# Patient Record
Sex: FEMALE | Race: WHITE | NOT HISPANIC OR LATINO | Employment: FULL TIME | URBAN - NONMETROPOLITAN AREA
[De-identification: names, ages, dates, MRNs, and addresses within clinical notes are randomized per-mention and may not be internally consistent; named-entity substitution may affect disease eponyms.]

---

## 2020-03-20 ENCOUNTER — OFFICE VISIT (OUTPATIENT)
Dept: PHARMACY | Facility: HOSPITAL | Age: 25
End: 2020-03-20

## 2020-03-20 VITALS
SYSTOLIC BLOOD PRESSURE: 125 MMHG | HEIGHT: 69 IN | WEIGHT: 155 LBS | BODY MASS INDEX: 22.96 KG/M2 | HEART RATE: 86 BPM | DIASTOLIC BLOOD PRESSURE: 82 MMHG | OXYGEN SATURATION: 95 %

## 2020-03-20 DIAGNOSIS — B18.2 CHRONIC HEPATITIS C WITHOUT HEPATIC COMA (HCC): Primary | ICD-10-CM

## 2020-03-20 LAB
ALBUMIN SERPL-MCNC: 4.6 G/DL (ref 3.5–5.2)
ALBUMIN/GLOB SERPL: 2.2 G/DL
ALP SERPL-CCNC: 88 U/L (ref 39–117)
ALPHA-FETOPROTEIN: 2.15 NG/ML (ref 0–8.3)
ALT SERPL W P-5'-P-CCNC: 55 U/L (ref 1–33)
ANION GAP SERPL CALCULATED.3IONS-SCNC: 12.2 MMOL/L (ref 5–15)
AST SERPL-CCNC: 39 U/L (ref 1–32)
BILIRUB SERPL-MCNC: 0.9 MG/DL (ref 0.2–1.2)
BUN BLD-MCNC: 8 MG/DL (ref 6–20)
BUN/CREAT SERPL: 15.4 (ref 7–25)
CALCIUM SPEC-SCNC: 9.2 MG/DL (ref 8.6–10.5)
CHLORIDE SERPL-SCNC: 102 MMOL/L (ref 98–107)
CO2 SERPL-SCNC: 25.8 MMOL/L (ref 22–29)
CREAT BLD-MCNC: 0.52 MG/DL (ref 0.57–1)
DEPRECATED RDW RBC AUTO: 38.7 FL (ref 37–54)
ERYTHROCYTE [DISTWIDTH] IN BLOOD BY AUTOMATED COUNT: 12.5 % (ref 12.3–15.4)
GFR SERPL CREATININE-BSD FRML MDRD: 146 ML/MIN/1.73
GLOBULIN UR ELPH-MCNC: 2.1 GM/DL
GLUCOSE BLD-MCNC: 91 MG/DL (ref 65–99)
HBV SURFACE AB SER RIA-ACNC: NORMAL
HCG SERPL QL: NEGATIVE
HCT VFR BLD AUTO: 37.5 % (ref 34–46.6)
HGB BLD-MCNC: 12.9 G/DL (ref 12–15.9)
HIV1+2 AB SER QL: NORMAL
INR PPP: 0.91 (ref 0.9–1.1)
MCH RBC QN AUTO: 29.3 PG (ref 26.6–33)
MCHC RBC AUTO-ENTMCNC: 34.4 G/DL (ref 31.5–35.7)
MCV RBC AUTO: 85 FL (ref 79–97)
PLATELET # BLD AUTO: 286 10*3/MM3 (ref 140–450)
PMV BLD AUTO: 11.4 FL (ref 6–12)
POTASSIUM BLD-SCNC: 3.7 MMOL/L (ref 3.5–5.2)
PROT SERPL-MCNC: 6.7 G/DL (ref 6–8.5)
PROTHROMBIN TIME: 12.7 SECONDS (ref 11–15.4)
RBC # BLD AUTO: 4.41 10*6/MM3 (ref 3.77–5.28)
SODIUM BLD-SCNC: 140 MMOL/L (ref 136–145)
TSH SERPL DL<=0.05 MIU/L-ACNC: 0.99 UIU/ML (ref 0.27–4.2)
WBC NRBC COR # BLD: 7.85 10*3/MM3 (ref 3.4–10.8)

## 2020-03-20 PROCEDURE — 86708 HEPATITIS A ANTIBODY: CPT

## 2020-03-20 PROCEDURE — 84443 ASSAY THYROID STIM HORMONE: CPT

## 2020-03-20 PROCEDURE — 80307 DRUG TEST PRSMV CHEM ANLYZR: CPT

## 2020-03-20 PROCEDURE — 36415 COLL VENOUS BLD VENIPUNCTURE: CPT

## 2020-03-20 PROCEDURE — 80053 COMPREHEN METABOLIC PANEL: CPT

## 2020-03-20 PROCEDURE — 86706 HEP B SURFACE ANTIBODY: CPT

## 2020-03-20 PROCEDURE — 86704 HEP B CORE ANTIBODY TOTAL: CPT

## 2020-03-20 PROCEDURE — G0483 DRUG TEST DEF 22+ CLASSES: HCPCS

## 2020-03-20 PROCEDURE — 87522 HEPATITIS C REVRS TRNSCRPJ: CPT

## 2020-03-20 PROCEDURE — G0432 EIA HIV-1/HIV-2 SCREEN: HCPCS

## 2020-03-20 PROCEDURE — 87902 NFCT AGT GNTYP ALYS HEP C: CPT

## 2020-03-20 PROCEDURE — 81596 NFCT DS CHRNC HCV 6 ASSAYS: CPT

## 2020-03-20 PROCEDURE — 82105 ALPHA-FETOPROTEIN SERUM: CPT

## 2020-03-20 PROCEDURE — 85610 PROTHROMBIN TIME: CPT

## 2020-03-20 PROCEDURE — 99243 OFF/OP CNSLTJ NEW/EST LOW 30: CPT | Performed by: PHYSICIAN ASSISTANT

## 2020-03-20 PROCEDURE — 84703 CHORIONIC GONADOTROPIN ASSAY: CPT

## 2020-03-20 PROCEDURE — 85027 COMPLETE CBC AUTOMATED: CPT

## 2020-03-20 RX ORDER — BUSPIRONE HYDROCHLORIDE 5 MG/1
5 TABLET ORAL 2 TIMES DAILY
COMMUNITY
End: 2020-10-15 | Stop reason: SDUPTHER

## 2020-03-20 NOTE — PROGRESS NOTES
: 1995    Chief Complaint   Patient presents with   • Hepatitis C       Yoli Quijano is a 24 y.o. female who presents to the office today as a consultation from AVERY Wall for evaluation of Hepatitis C.    History of Present Illness:  She has known about having Hepatitis C since 2019. She found out while she was pregnant. She has been clean now from illicit drug use for the past 4 years. Does admit past history of IVDU. Has only professional tattoos. No personal history of other liver disease. No known family history of liver disease. No personal history of alcoholism and no current alcohol use. No recent liver imaging. She would like to be considered for Hepatitis C therapy. 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     Review of Systems   Constitutional: Negative for chills, fatigue and fever.   HENT: Negative for trouble swallowing.    Eyes: Negative.    Respiratory: Negative for cough, choking, chest tightness and stridor.    Cardiovascular: Negative for chest pain.   Gastrointestinal: Negative for abdominal distention, abdominal pain, anal bleeding, blood in stool, constipation, diarrhea, nausea and vomiting.   Endocrine: Negative.    Genitourinary: Negative for difficulty urinating.   Musculoskeletal: Negative.    Skin: Negative for color change, pallor, rash and wound.   Allergic/Immunologic: Negative for environmental allergies and food allergies.   Neurological: Negative for dizziness, light-headedness and headaches.   Hematological: Does not bruise/bleed easily.   Psychiatric/Behavioral: Negative.      I have reviewed and confirmed the accuracy of the ROS as documented by the MA/LPN/RN Katie Heaton PA-C    Past Medical History:   Diagnosis Date   • Infectious viral hepatitis        Past Surgical History:   Procedure Laterality Date   • BREAST BIOPSY     • DILATATION AND CURETTAGE         Family  "History   Problem Relation Age of Onset   • No Known Problems Mother    • No Known Problems Father        Social History     Socioeconomic History   • Marital status: Unknown     Spouse name: Not on file   • Number of children: Not on file   • Years of education: Not on file   • Highest education level: Not on file   Tobacco Use   • Smoking status: Never Smoker   • Smokeless tobacco: Never Used   Substance and Sexual Activity   • Alcohol use: Not Currently   • Drug use: Not Currently   • Sexual activity: Defer       Current Outpatient Medications:   •  busPIRone (BUSPAR) 7.5 MG tablet, Take 7.5 mg by mouth 2 (Two) Times a Day., Disp: , Rfl:     Allergies:   Patient has no known allergies.    Vitals:  /82 (BP Location: Right arm, Patient Position: Sitting, Cuff Size: Adult)   Pulse 86   Ht 175.3 cm (69\")   Wt 70.3 kg (155 lb)   SpO2 95%   BMI 22.89 kg/m²     Physical Exam   Constitutional: She is oriented to person, place, and time. She appears well-developed and well-nourished. No distress.   HENT:   Head: Normocephalic and atraumatic.   Nose: Nose normal.   Mouth/Throat: Oropharynx is clear and moist.   Eyes: Conjunctivae are normal. Right eye exhibits no discharge. Left eye exhibits no discharge. No scleral icterus.   Neck: Normal range of motion. No JVD present.   Cardiovascular: Normal rate, regular rhythm and normal heart sounds. Exam reveals no gallop and no friction rub.   No murmur heard.  Pulmonary/Chest: Effort normal and breath sounds normal. No respiratory distress. She has no wheezes. She has no rales. She exhibits no tenderness.   Abdominal: Soft. Bowel sounds are normal. She exhibits no mass. There is no tenderness.   Musculoskeletal: Normal range of motion. She exhibits no edema or deformity.   Neurological: She is alert and oriented to person, place, and time. Coordination normal.   Skin: Skin is warm and dry. No rash noted. She is not diaphoretic. No erythema.   Psychiatric: She has a " normal mood and affect. Her behavior is normal. Judgment and thought content normal.   Vitals reviewed.    Results Review:  Labs 2/2020: Acute hepatitis panel positive Hep C antibody    Assessment:  1. Chronic hepatitis C without hepatic coma (CMS/HCC)      Plan:  Orders Placed This Encounter   Procedures   • US Liver   • AFP Tumor Marker   • CBC (No Diff)   • Comprehensive Metabolic Panel   • HCV FibroSURE   • HCV RNA By PCR, Qn Rfx Yarelis   • Hepatitis A Antibody, Total   • Hepatitis B Core Antibody, Total   • Hepatitis B Surface Antibody   • HIV-1 / O / 2 Ag / Antibody 4th Generation   • Protime-INR   • Compliance Drug Analysis, Ur - Urine, Clean Catch   • TSH   • hCG, Serum, Qualitative     More recommendations will be made after these results have been reviewed. She will continue to abstain from alcohol and illegal drugs. She will have US liver to determine if any lesions or other liver diseases present. Immunity to Hep A and B will be determined and vaccinations recommended if needed. If drug and alcohol screen negative, then we will proceed with Hep C therapy and will submit Rx to insurance company for approval. Treatment will depend on fibrosis score and Hep C genotype. When approved, she will  Rx from our pharmacy and have another appointment with me. Hep C viral load lab will be checked 4 weeks after start of therapy, at end of therapy and 3 months s/p therapy to determine response to treatment and cure. She will call with concerns.           Return in about 2 weeks (around 4/3/2020) for discussion of results.      Electronically signed 3/20/2020 11:36  Katie Heaton PA-C, [facility]

## 2020-03-21 LAB
HAV AB SER QL IA: POSITIVE
HBV CORE AB SER DONR QL IA: NEGATIVE

## 2020-03-24 LAB
A2 MACROGLOB SERPL-MCNC: 303 MG/DL (ref 110–276)
ALT SERPL W P-5'-P-CCNC: 65 IU/L (ref 0–40)
APO A-I SERPL-MCNC: 176 MG/DL (ref 116–209)
BILIRUB SERPL-MCNC: 0.8 MG/DL (ref 0–1.2)
CONV REPORT SUMMARY: NORMAL
FIBROSIS SCORING:: ABNORMAL
FIBROSIS STAGE SERPL QL: ABNORMAL
GGT SERPL-CCNC: 32 IU/L (ref 0–60)
HAPTOGLOB SERPL-MCNC: 100 MG/DL (ref 33–278)
HCV AB SER QL: ABNORMAL
LABORATORY COMMENT REPORT: ABNORMAL
LIMITATIONS:: ABNORMAL
LIVER FIBR SCORE SERPL CALC.FIBROSURE: 0.21 (ref 0–0.21)
NECROINFLAMM ACTIVITY SCORING:: ABNORMAL
NECROINFLAMMATORY ACT GRADE SERPL QL: ABNORMAL
NECROINFLAMMATORY ACT SCORE SERPL: 0.36 (ref 0–0.17)

## 2020-04-01 ENCOUNTER — HOSPITAL ENCOUNTER (OUTPATIENT)
Dept: ULTRASOUND IMAGING | Facility: HOSPITAL | Age: 25
Discharge: HOME | End: 2020-04-01
Admitting: PHYSICIAN ASSISTANT

## 2020-04-01 ENCOUNTER — APPOINTMENT (OUTPATIENT)
Dept: PHARMACY | Facility: HOSPITAL | Age: 25
End: 2020-04-01

## 2020-04-01 ENCOUNTER — OFFICE VISIT (OUTPATIENT)
Dept: PHARMACY | Facility: HOSPITAL | Age: 25
End: 2020-04-01

## 2020-04-01 VITALS — BODY MASS INDEX: 22.96 KG/M2 | WEIGHT: 155 LBS | HEIGHT: 69 IN

## 2020-04-01 DIAGNOSIS — B18.2 CHRONIC HEPATITIS C WITHOUT HEPATIC COMA (HCC): Primary | ICD-10-CM

## 2020-04-01 DIAGNOSIS — Z23 NEED FOR HEPATITIS B VACCINATION: ICD-10-CM

## 2020-04-01 DIAGNOSIS — B18.2 CHRONIC HEPATITIS C WITHOUT HEPATIC COMA (HCC): ICD-10-CM

## 2020-04-01 PROCEDURE — 76705 ECHO EXAM OF ABDOMEN: CPT

## 2020-04-01 PROCEDURE — 99214 OFFICE O/P EST MOD 30 MIN: CPT | Performed by: PHYSICIAN ASSISTANT

## 2020-04-01 PROCEDURE — 76705 ECHO EXAM OF ABDOMEN: CPT | Performed by: RADIOLOGY

## 2020-04-01 NOTE — PROGRESS NOTES
: 1995    Chief Complaint   Patient presents with   • Hepatitis C       Yoli Quijano is a 24 y.o. female who presents to the office today as a follow up appointment regarding Hepatitis C.    History of Present Illness:  She would like to discuss results from previous labs and imaging. She would like to be considered for Hepatitis C therapy.     She has known about having Hepatitis C since 2019. She found out while she was pregnant. She has been clean now from illicit drug use for the past 4 years. Does admit past history of IVDU. Has only professional tattoos. No personal history of other liver disease. No known family history of liver disease. No personal history of alcoholism and no current alcohol use. 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     Review of Systems   Constitutional: Negative for chills, fatigue and fever.   HENT: Negative for trouble swallowing.    Eyes: Negative.    Respiratory: Negative for cough, choking, chest tightness and stridor.    Cardiovascular: Negative for chest pain.   Gastrointestinal: Negative for abdominal distention, abdominal pain, anal bleeding, blood in stool, constipation, diarrhea, nausea and vomiting.   Endocrine: Negative.    Genitourinary: Negative for difficulty urinating.   Musculoskeletal: Negative.    Skin: Negative for color change, pallor, rash and wound.   Allergic/Immunologic: Negative for environmental allergies and food allergies.   Neurological: Negative for dizziness, light-headedness and headaches.   Hematological: Does not bruise/bleed easily.   Psychiatric/Behavioral: Negative.      I have reviewed and confirmed the accuracy of the ROS as documented by the MA/LPN/RN Katie Heaton PA-C    Past Medical History:   Diagnosis Date   • Infectious viral hepatitis        Past Surgical History:   Procedure Laterality Date   • BREAST BIOPSY     • DILATATION AND CURETTAGE   "       Family History   Problem Relation Age of Onset   • No Known Problems Mother    • No Known Problems Father        Social History     Socioeconomic History   • Marital status: Unknown     Spouse name: Not on file   • Number of children: Not on file   • Years of education: Not on file   • Highest education level: Not on file   Tobacco Use   • Smoking status: Never Smoker   • Smokeless tobacco: Never Used   Substance and Sexual Activity   • Alcohol use: Not Currently   • Drug use: Not Currently   • Sexual activity: Defer       Current Outpatient Medications:   •  busPIRone (BUSPAR) 7.5 MG tablet, Take 7.5 mg by mouth 2 (Two) Times a Day., Disp: , Rfl:     Allergies:   Patient has no known allergies.    Vitals:  Ht 175.3 cm (69\")   Wt 70.3 kg (155 lb)   BMI 22.89 kg/m²     Physical Exam   Constitutional: She is oriented to person, place, and time. She appears well-developed and well-nourished. No distress.   HENT:   Head: Normocephalic and atraumatic.   Right Ear: External ear normal.   Left Ear: External ear normal.   Nose: Nose normal.   Eyes: Conjunctivae and EOM are normal. Right eye exhibits no discharge. Left eye exhibits no discharge. No scleral icterus.   Neck: Normal range of motion. No tracheal deviation present.   Pulmonary/Chest: Effort normal. No respiratory distress.   Musculoskeletal: Normal range of motion. She exhibits no edema.   Neurological: She is alert and oriented to person, place, and time. Coordination normal.   Skin: No rash noted. She is not diaphoretic. No erythema. No pallor.   Psychiatric: She has a normal mood and affect. Her behavior is normal. Judgment and thought content normal.     Results Review:  Labs 2/2020: Acute hepatitis panel positive Hep C antibody    Labs 3/20/2020: AFP normal, CBC normal, CMP shows elevated ALT at 55, elevated AST 39, alkaline phosphatase normal 88, , total bilirubin 0.9, fibrosis score F0, inflammation A1, hepatitis C viral load still pending, " hepatitis A total antibody positive, hepatitis B core total antibody negative, hepatitis B surface antibody negative, HIV negative, INR normal, TSH normal, pregnancy negative, urine drug screen shows no drugs.    Ultrasound liver 4/1/2020: Normal.    Assessment:  1. Chronic hepatitis C without hepatic coma (CMS/HCC)    2. Need for hepatitis B vaccination      Plan:  After review of pending HCV quant lab confirming that she needs Hepatitis C therapy, she will be prescribed treatment. She will likely have treatment with Mavyret, Glecaprevir-Pibrentasvir 100-MG tablet, 3 tabs p.o. once daily for 8 weeks.  She has hepatitis C, genotype unknown at this time, treatment naïve without cirrhosis.  She will continue to abstain from alcohol and illicit drug use.  After starting treatment she will have labs 4 weeks into therapy to monitor response, more labs at completion of therapy and final labs 3 months status post completion of therapy for confirmation of cure.    Series of immunizations for Hepatitis B should be obtained and have been recommended today. Immunity is important to prevent further insult to her liver. She is already immune to Hepatitis A. Information regarding locations that this can be performed has been expressed.          Follow-up after taking hepatitis C therapy for 4 weeks to recheck hep C.      Electronically signed 4/1/2020 10:53  Katie Heaton PA-C, [facility]

## 2020-04-06 LAB
HCV GENTYP SERPL NAA+PROBE: NORMAL
HCV GENTYP SERPL NAA+PROBE: NORMAL
HCV RNA SERPL NAA+PROBE-ACNC: NORMAL IU/ML
HCV RNA SERPL NAA+PROBE-LOG IU: 5.55 LOG10 IU/ML
Lab: NORMAL
REF LAB TEST REF RANGE: NORMAL

## 2020-04-07 ENCOUNTER — TELEPHONE (OUTPATIENT)
Dept: GASTROENTEROLOGY | Facility: CLINIC | Age: 25
End: 2020-04-07

## 2020-04-07 ENCOUNTER — DISEASE STATE MANAGEMENT VISIT (OUTPATIENT)
Dept: PHARMACY | Facility: HOSPITAL | Age: 25
End: 2020-04-07

## 2020-04-07 DIAGNOSIS — B18.2 CHRONIC HEPATITIS C WITHOUT HEPATIC COMA (HCC): Primary | ICD-10-CM

## 2020-04-07 DIAGNOSIS — B18.2 CHRONIC HEPATITIS C WITHOUT HEPATIC COMA (HCC): ICD-10-CM

## 2020-04-07 RX ORDER — VELPATASVIR AND SOFOSBUVIR 100; 400 MG/1; MG/1
1 TABLET, FILM COATED ORAL DAILY
Qty: 28 TABLET | Refills: 2 | Status: SHIPPED | OUTPATIENT
Start: 2020-04-07 | End: 2020-10-15

## 2020-04-07 NOTE — PROGRESS NOTES
Medication Management Note    Yoli Quijano is a 25 yo female seen in the [facility] for Hepatitis C treatment.     Past Medical History:   Diagnosis Date   • Infectious viral hepatitis      Social History     Socioeconomic History   • Marital status: Unknown     Spouse name: Not on file   • Number of children: Not on file   • Years of education: Not on file   • Highest education level: Not on file   Tobacco Use   • Smoking status: Never Smoker   • Smokeless tobacco: Never Used   Substance and Sexual Activity   • Alcohol use: Not Currently   • Drug use: Not Currently   • Sexual activity: Defer     No Known Allergies  Current Outpatient Medications   Medication Sig Dispense Refill   • busPIRone (BUSPAR) 7.5 MG tablet Take 7.5 mg by mouth 2 (Two) Times a Day.     • Glecaprevir-Pibrentasvir (Mavyret) 100-40 MG tablet Take 3 tablets by mouth Daily for 56 days. 84 tablet 1     No current facility-administered medications for this visit.        Labs    Lab Results   Component Value Date    WBC 7.85 03/20/2020    HGB 12.9 03/20/2020    HCT 37.5 03/20/2020    MCV 85.0 03/20/2020     03/20/2020      Lab Results   Component Value Date    GLUCOSE 91 03/20/2020    BUN 8 03/20/2020    CREATININE 0.52 (L) 03/20/2020    EGFRIFNONA 146 03/20/2020    BCR 15.4 03/20/2020    K 3.7 03/20/2020    CO2 25.8 03/20/2020    CALCIUM 9.2 03/20/2020    ALBUMIN 4.60 03/20/2020    AST 39 (H) 03/20/2020    ALT 55 (H) 03/20/2020      No results found for: HEPCAB   Lab Results   Component Value Date    HAV Positive (A) 03/20/2020    HEPBCAB Negative 03/20/2020      Lab Results   Component Value Date    INR 0.91 03/20/2020    PROTIME 12.7 03/20/2020       Drug Interactions Screening    A drug-drug interactions check was made. No interactions expected according to literature.    Assessment & Plan    Patient has Hepatitis C, genotype 2b without cirrhosis (F0-F1) and is treatment naïve. Patient has been prescribed Mavyret 3  tablets daily x 8 weeks.  However, Pt's insurance prefers Epclusa.  Will order Epclusa 1 tablet daily x 12 weeks.     Will begin prior authorization, if applicable and provide medication counseling to patient once drug is approved and ready to dispense.  Pt will follow up with clinic 4 weeks after starting medication to assess virologic response and medication tolerability.     Danielle Gregorio [facility]  04/07/20  10:07

## 2020-04-07 NOTE — TELEPHONE ENCOUNTER
Pt has chronic Hep C infection, genotype 2b, treatment naive, without cirrhosis (F0 fibrosis score). She has been prescribed Mavyret for 8 weeks for tx.     Jacqueline- please let pt know the lab resulted and we are now starting drug approval process. Thanks.

## 2020-04-07 NOTE — PROGRESS NOTES
Patient was counseled on new medication Epclusa (sofosbuvir and velpatasvir)     The patient was counseled on the following:     - Take 1 tablet at the same time each day, with or without food.   - This medication is used to treat hepatitis C infection.   - Frequently reported side effects of this drug include: headache, loss of energy, nausea and trouble sleeping  - Talk to your doctor right away if you notice dark urine, fatigue, lack of appetite, nausea, abdominal pain, light-colored stools, vomiting or yellow skin.   - Go to the ED with signs of a significant reaction including wheezing; chest tightness; fever; itching; bad cough; blue skin color; seizures; or swelling of face, lips, tongue or     throat.   - Be sure to follow up with our clinic 4 weeks after starting the medication to ensure you are tolerating the medication well and that you are responding appropriately to the medication.   - Make sure to tell your doctor or pharmacist about all medications you are taking, including herbal supplements and OTC products.    - Do not stop taking this medication without talking to your provider.   - It is very important that you do not miss a dose of this medication.  Use a pill planner, medication adherence carlos or other tool to help you remember how to take your medication.      Danielle Gregorio [facility]  04/07/20  15:10

## 2020-05-06 ENCOUNTER — APPOINTMENT (OUTPATIENT)
Dept: PHARMACY | Facility: HOSPITAL | Age: 25
End: 2020-05-06

## 2020-05-06 ENCOUNTER — OFFICE VISIT (OUTPATIENT)
Dept: GASTROENTEROLOGY | Facility: CLINIC | Age: 25
End: 2020-05-06

## 2020-05-06 VITALS — HEIGHT: 69 IN | BODY MASS INDEX: 22.96 KG/M2 | WEIGHT: 155 LBS

## 2020-05-06 DIAGNOSIS — B18.2 CHRONIC HEPATITIS C WITHOUT HEPATIC COMA (HCC): Primary | ICD-10-CM

## 2020-05-06 PROCEDURE — 99213 OFFICE O/P EST LOW 20 MIN: CPT | Performed by: PHYSICIAN ASSISTANT

## 2020-05-06 RX ORDER — SERTRALINE HYDROCHLORIDE 25 MG/1
25 TABLET, FILM COATED ORAL DAILY
COMMUNITY
End: 2020-10-15

## 2020-05-06 NOTE — PROGRESS NOTES
: 1995    Chief Complaint   Patient presents with   • Hepatitis C     You have chosen to receive care through a telephone visit. Do you consent to use a telephone visit for your medical care today? Yes    Yoli Quijano is a 24 y.o. female who presents to the office today as a follow up appointment regarding Hepatitis C.    History of Present Illness:  She has been taking Epclusa 1 tab PO once daily for the past 4 weeks. She has already gotten her refill of this medication. She has not missed any doses. Has not noticed any side effects with the medication including nausea, fatigue or headaches. She is feeling well today. She is unable to come for labs until Friday due to her work schedule.     She has known about having Hepatitis C since 2019. She found out while she was pregnant. She has been clean now from illicit drug use for the past 4 years. Does admit past history of IVDU. Has only professional tattoos. No personal history of other liver disease. No known family history of liver disease. No personal history of alcoholism and no current alcohol use.      Review of Systems   Constitutional: Negative for activity change, chills, fatigue and unexpected weight change.   HENT: Negative for sore throat and trouble swallowing.    Eyes: Negative.    Respiratory: Negative for chest tightness and shortness of breath.    Gastrointestinal: Negative for abdominal distention, abdominal pain, anal bleeding, blood in stool, constipation, diarrhea, nausea, rectal pain and vomiting.   Endocrine: Negative.    Genitourinary: Negative for difficulty urinating.   Musculoskeletal: Negative for back pain and neck pain.   Skin: Negative.    Allergic/Immunologic: Negative for environmental allergies and food allergies.   Neurological: Negative for dizziness and headaches.   Hematological: Does not bruise/bleed easily.   Psychiatric/Behavioral: Negative for agitation and sleep disturbance. The patient is not nervous/anxious.   "    I have reviewed and confirmed the accuracy of the ROS as documented by the MA/LPN/RN Katie Heaton PA-C    Past Medical History:   Diagnosis Date   • Infectious viral hepatitis        Past Surgical History:   Procedure Laterality Date   • BREAST BIOPSY     • DILATATION AND CURETTAGE         Family History   Problem Relation Age of Onset   • No Known Problems Mother    • No Known Problems Father        Social History     Socioeconomic History   • Marital status: Unknown     Spouse name: Not on file   • Number of children: Not on file   • Years of education: Not on file   • Highest education level: Not on file   Tobacco Use   • Smoking status: Never Smoker   • Smokeless tobacco: Never Used   Substance and Sexual Activity   • Alcohol use: Not Currently   • Drug use: Not Currently   • Sexual activity: Defer       Current Outpatient Medications:   •  busPIRone (BUSPAR) 7.5 MG tablet, Take 7.5 mg by mouth 2 (Two) Times a Day., Disp: , Rfl:   •  sertraline (ZOLOFT) 25 MG tablet, Take 25 mg by mouth Daily., Disp: , Rfl:   •  Sofosbuvir-Velpatasvir (Epclusa) 400-100 MG tablet, Take 1 tablet by mouth Daily., Disp: 28 tablet, Rfl: 2    Allergies:   Patient has no known allergies.    Vitals:  Ht 175.3 cm (69\")   Wt 70.3 kg (155 lb)   BMI 22.89 kg/m²   Not all vitals taken- telephone visit    Physical Exam   Constitutional: She is oriented to person, place, and time.   HENT:   Voice normal   Pulmonary/Chest: No respiratory distress.   Neurological: She is alert and oriented to person, place, and time.   Psychiatric: She has a normal mood and affect.   Audio only    Results Review:  Labs 2/2020: Acute hepatitis panel positive Hep C antibody     Labs 3/20/2020: AFP normal, CBC normal, CMP shows elevated ALT at 55, elevated AST 39, alkaline phosphatase normal 88, , total bilirubin 0.9, fibrosis score F0, inflammation A1, hepatitis C viral load 352,000, Hep C genotype 2b, hepatitis A total antibody positive, hepatitis " B core total antibody negative, hepatitis B surface antibody negative, HIV negative, INR normal, TSH normal, pregnancy negative, urine drug screen shows no drugs.     Ultrasound liver 4/1/2020: Normal.    Assessment:  1. Chronic hepatitis C without hepatic coma (CMS/HCC)      Plan:  Orders Placed This Encounter   Procedures   • Hepatitis C RNA, Quantitative, PCR (graph)   • Comprehensive Metabolic Panel     She will continue taking Epclusa 1 tab PO once daily for treatment of chronic Hepatitis C infection, genotype 2b, treatment naive, without cirrhosis. She will not miss any doses of this medication, total duration of treatment is 12 weeks. Labs will be completed this week (4 weeks after start of therapy) to monitor response to treatment, again at completion of therapy and 3 months s/p completion of therapy. Continue to abstain from alcohol and illicit drug use. Call with concerns.      This visit has been rescheduled as a phone visit to comply with patient safety concerns in accordance with CDC recommendations. Total time of discussion was 12 minutes.    Follow up after final labs have been completed to re-check Hep C.       Electronically signed 5/6/2020 09:33  Katie Heaton PA-C, [facility]

## 2020-05-08 ENCOUNTER — LAB (OUTPATIENT)
Dept: LAB | Facility: HOSPITAL | Age: 25
End: 2020-05-08

## 2020-05-08 DIAGNOSIS — B18.2 CHRONIC HEPATITIS C WITHOUT HEPATIC COMA (HCC): ICD-10-CM

## 2020-05-08 PROCEDURE — 80053 COMPREHEN METABOLIC PANEL: CPT

## 2020-05-08 PROCEDURE — 87522 HEPATITIS C REVRS TRNSCRPJ: CPT

## 2020-05-08 PROCEDURE — 36415 COLL VENOUS BLD VENIPUNCTURE: CPT

## 2020-05-09 LAB
ALBUMIN SERPL-MCNC: 4.2 G/DL (ref 3.5–5.2)
ALBUMIN/GLOB SERPL: 1.6 G/DL
ALP SERPL-CCNC: 83 U/L (ref 39–117)
ALT SERPL W P-5'-P-CCNC: 7 U/L (ref 1–33)
ANION GAP SERPL CALCULATED.3IONS-SCNC: 10.2 MMOL/L (ref 5–15)
AST SERPL-CCNC: 14 U/L (ref 1–32)
BILIRUB SERPL-MCNC: 0.3 MG/DL (ref 0.2–1.2)
BUN BLD-MCNC: 12 MG/DL (ref 6–20)
BUN/CREAT SERPL: 23.1 (ref 7–25)
CALCIUM SPEC-SCNC: 9.2 MG/DL (ref 8.6–10.5)
CHLORIDE SERPL-SCNC: 101 MMOL/L (ref 98–107)
CO2 SERPL-SCNC: 28.8 MMOL/L (ref 22–29)
CREAT BLD-MCNC: 0.52 MG/DL (ref 0.57–1)
GFR SERPL CREATININE-BSD FRML MDRD: 146 ML/MIN/1.73
GLOBULIN UR ELPH-MCNC: 2.6 GM/DL
GLUCOSE BLD-MCNC: 99 MG/DL (ref 65–99)
POTASSIUM BLD-SCNC: 3.8 MMOL/L (ref 3.5–5.2)
PROT SERPL-MCNC: 6.8 G/DL (ref 6–8.5)
SODIUM BLD-SCNC: 140 MMOL/L (ref 136–145)

## 2020-05-17 LAB
HCV RNA SERPL NAA+PROBE-ACNC: NORMAL IU/ML
TEST INFORMATION: NORMAL

## 2020-05-18 ENCOUNTER — TELEPHONE (OUTPATIENT)
Dept: GASTROENTEROLOGY | Facility: CLINIC | Age: 25
End: 2020-05-18

## 2020-05-18 NOTE — TELEPHONE ENCOUNTER
Labs at 4 weeks into therapy with Epclusa show liver enzymes normal and HCV not detected. Continue med as prescribed, have labs at end of treatment. Please let her know. Thanks.

## 2020-07-01 ENCOUNTER — APPOINTMENT (OUTPATIENT)
Dept: PHARMACY | Facility: HOSPITAL | Age: 25
End: 2020-07-01

## 2020-10-15 ENCOUNTER — OFFICE VISIT (OUTPATIENT)
Dept: PSYCHIATRY | Facility: CLINIC | Age: 25
End: 2020-10-15

## 2020-10-15 VITALS
DIASTOLIC BLOOD PRESSURE: 68 MMHG | TEMPERATURE: 97.8 F | HEART RATE: 84 BPM | HEIGHT: 66 IN | SYSTOLIC BLOOD PRESSURE: 115 MMHG | WEIGHT: 178.2 LBS | BODY MASS INDEX: 28.64 KG/M2

## 2020-10-15 DIAGNOSIS — F41.9 ANXIETY DISORDER, UNSPECIFIED TYPE: ICD-10-CM

## 2020-10-15 DIAGNOSIS — G47.9 SLEEPING DIFFICULTIES: ICD-10-CM

## 2020-10-15 DIAGNOSIS — F39 UNSPECIFIED MOOD (AFFECTIVE) DISORDER (HCC): Primary | ICD-10-CM

## 2020-10-15 PROCEDURE — 90792 PSYCH DIAG EVAL W/MED SRVCS: CPT | Performed by: NURSE PRACTITIONER

## 2020-10-15 RX ORDER — BUSPIRONE HYDROCHLORIDE 10 MG/1
10 TABLET ORAL 2 TIMES DAILY
Qty: 60 TABLET | Refills: 0 | Status: SHIPPED | OUTPATIENT
Start: 2020-10-15 | End: 2020-11-12 | Stop reason: SDUPTHER

## 2020-10-15 RX ORDER — ESCITALOPRAM OXALATE 20 MG/1
20 TABLET ORAL DAILY
Qty: 30 TABLET | Refills: 0 | Status: SHIPPED | OUTPATIENT
Start: 2020-10-15 | End: 2020-11-12 | Stop reason: SDUPTHER

## 2020-10-15 RX ORDER — LAMOTRIGINE 25 MG/1
25 TABLET ORAL DAILY
Qty: 30 TABLET | Refills: 0 | Status: SHIPPED | OUTPATIENT
Start: 2020-10-15 | End: 2020-11-12 | Stop reason: SINTOL

## 2020-10-15 RX ORDER — ESCITALOPRAM OXALATE 20 MG/1
20 TABLET ORAL DAILY
COMMUNITY
End: 2020-10-15 | Stop reason: SDUPTHER

## 2020-10-15 NOTE — PROGRESS NOTES
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 at any time they are to call the clinic and if unable to get immediate assistance should immediately call 911 or go to the nearest emergency room.  The patient is advised to remove or secure (lock away) all lethal weapons (including guns) and sharps (including razors, scissors, knives, etc.).  All medications (including any prescribed and any over the counter medications) should be stored in a safe and secured location that is not obtainable by children/adolescents.  Patient was given an opportunity and encouraged to ask questions about their medication, illness, and treatment. Patient contracted verbally for the following: If you are experiencing an emotional crisis or have thoughts of harming yourself or others, please go to your nearest local emergency room or call 911. Will continue to re-assess medication response and side effects frequently to establish efficacy and ensure safety. Risks, any black box warnings, side effects, off label usage, and benefits of medication and treatment discussed with patient, along with potential adverse side effects of current and/or newly prescribed medication, alternative treatment options, and OTC medications.  Patient verbalized understanding of potential risks, any off label use of  medication, any black box warnings, and any side effects in their own words. The patient verbalized understanding and agreed to comply with the safety plan discussed in their own words.  Patient given the number to the office. Number also available to the 24- hour suicide hotline.      MEDS ORDERED DURING VISIT:  New Medications Ordered This Visit   Medications   • busPIRone (BUSPAR) 10 MG tablet     Sig: Take 1 tablet by mouth 2 (Two) Times a Day.     Dispense:  60 tablet     Refill:  0   • escitalopram (LEXAPRO) 20 MG tablet     Sig: Take 1 tablet by mouth Daily.     Dispense:  30 tablet     Refill:  0   • lamoTRIgine (LaMICtal) 25 MG tablet     Sig: Take 1 tablet by mouth Daily.     Dispense:  30 tablet     Refill:  0       Return in about 2 weeks (around 10/29/2020), or if symptoms worsen or fail to improve, for Recheck.       Functional Status: Moderate impairment     Prognosis: Guarded with Ongoing Treatment            This document has been electronically signed by AVERY Nettles  October 15, 2020 10:05 EDT    Please note that portions of this note were completed with a voice recognition program. Efforts were made to edit dictation, but occasionally words are mistranscribed.

## 2020-11-02 ENCOUNTER — TELEPHONE (OUTPATIENT)
Dept: PSYCHIATRY | Facility: CLINIC | Age: 25
End: 2020-11-02

## 2020-11-02 NOTE — TELEPHONE ENCOUNTER
It could be related to the Lamictal.  Please have her stop the Lamictal and see if that stops the itching.  If any rash develops please let us know immediately, or go to ER as needed.

## 2020-11-02 NOTE — TELEPHONE ENCOUNTER
Patient called and stated that since Thursday she has been itching a lot said it has continue and she thinks it the Lamictal please advise

## 2020-11-12 ENCOUNTER — OFFICE VISIT (OUTPATIENT)
Dept: PSYCHIATRY | Facility: CLINIC | Age: 25
End: 2020-11-12

## 2020-11-12 VITALS
HEIGHT: 66 IN | TEMPERATURE: 97.7 F | HEART RATE: 78 BPM | OXYGEN SATURATION: 98 % | WEIGHT: 175.2 LBS | SYSTOLIC BLOOD PRESSURE: 104 MMHG | BODY MASS INDEX: 28.16 KG/M2 | DIASTOLIC BLOOD PRESSURE: 68 MMHG

## 2020-11-12 DIAGNOSIS — F41.9 ANXIETY DISORDER, UNSPECIFIED TYPE: ICD-10-CM

## 2020-11-12 DIAGNOSIS — F39 UNSPECIFIED MOOD (AFFECTIVE) DISORDER (HCC): Primary | ICD-10-CM

## 2020-11-12 DIAGNOSIS — G47.9 SLEEPING DIFFICULTIES: ICD-10-CM

## 2020-11-12 PROCEDURE — 99213 OFFICE O/P EST LOW 20 MIN: CPT | Performed by: NURSE PRACTITIONER

## 2020-11-12 RX ORDER — BUSPIRONE HYDROCHLORIDE 15 MG/1
15 TABLET ORAL 2 TIMES DAILY
Qty: 60 TABLET | Refills: 0 | Status: SHIPPED | OUTPATIENT
Start: 2020-11-12 | End: 2020-12-04 | Stop reason: SDUPTHER

## 2020-11-12 RX ORDER — ESCITALOPRAM OXALATE 20 MG/1
20 TABLET ORAL DAILY
Qty: 30 TABLET | Refills: 0 | Status: SHIPPED | OUTPATIENT
Start: 2020-11-12 | End: 2020-12-04 | Stop reason: SDUPTHER

## 2020-11-12 RX ORDER — TRAZODONE HYDROCHLORIDE 50 MG/1
50 TABLET ORAL NIGHTLY
Qty: 30 TABLET | Refills: 0 | Status: SHIPPED | OUTPATIENT
Start: 2020-11-12 | End: 2020-12-03 | Stop reason: SDUPTHER

## 2020-11-12 NOTE — PROGRESS NOTES
"This provider is located at [facility], [address] [place] [address], [place], [address] using Zoom.  The patient is seen remotely at [facility], [address] [place] [address], [place] [address] via Zoom, an encrypted service provided through [facility] with staff present.  The patient's condition being diagnosed/treated is appropriate for telemedicine. The provider identified herself as well as her credentials.  The patient, and patient's guardian, consent to be seen remotely, and when consent is given they understand that the consent allows for patient identifiable information to be sent to a third party as needed.   They may refuse to be seen remotely at any time. The electronic data is encrypted and password protected, and the patient has been advised of the potential risks to privacy notwithstanding such measures.        Felipe Quijnao is a 25 y.o. female who presents today for initial evaluation     Chief Complaint:  Depression and Anxiety    History of Present Illness:  Accompanied by: The patient is alone at today's encounter.  The patient describes her mood as \"about the same\" over the last few weeks.  The patient reports her appetite as fair.  The patient reports her sleep as poor.  The patient states she usually just plays on her phone when she doesn't sleep so she doesn't wake her baby up.  The patient states she averages about 4-5 hours of sleep per night.  The patient denies any nightmares or bad dreams.  The patient denies any new medical problems or changes in medications since last appointment with this facility.  The patient reports compliance with current medication regimen, but states she did have to stop the Lamictal because she itched all over.  She states about 3 days after stopping the Lamictal her itching completely resolved.  The patient denies any current side effects from her current medication regimen.  The patient denies any abnormal muscle movements or tics.  The " patient rates her depression at a 5/10 on a 0-10 scale, with 10 being the worst.  The patient rates her anxiety at a 10/10 on a 0-10 scale, with 10 being the worst.  The patient rates hopelessness at a 0/10 on a 0-10 scale with 10 being the worst.  The patient would like to adjust her medications at this visit.  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  She states she will start on the 23rd of this month.  The patient states she does continue to nap sometimes throughout the day, so this will stop her from napping during the day so hopefully she can work on improving her sleep hygiene and sleep more through the night.  The patient denies any auditory or visual hallucinations.  The patient adamantly denies any suicidal or homicidal ideations, plans, or intent at the time of this encounter and is convincing.      Prior Psychiatric Medications:  The patient has taken:  Abilify - caused a rash about a week into treatment.  Seroquel - states she didn't take it long enough to notice any effects.  Remeron - States she think it helped, but isn't sure.  Zoloft - helped a little bit, but not much.  Prozac - ineffective.  Celexa - ineffective.  Lamictal - caused itching    Last Menstrual Period:  3-4 weeks ago, states it is due.  The patient is not breast feeding.  The patient was educated that her prescribed medications can have potential risk to a developing fetus. The patient is advised to contact this APRN/this office if she becomes pregnant or plans to become pregnant.  Pt verbalizes understanding and acknowledged agreement with this plan in her own words.  The patient states she has a history of 2 miscarriages, and after the birth of her daughter they told her she would never be able to have any more children.      The following portions of the patient's history were reviewed and updated as appropriate: allergies, current medications, past family history, past medical  history, past social history, past surgical history and problem list.        Past Medical History:  Past Medical History:   Diagnosis Date   • Anxiety    • Depression    • Infectious viral hepatitis     Hep C from IV drug use       Social History:  Social History     Socioeconomic History   • Marital status: Single     Spouse name: Not on file   • Number of children: Not on file   • Years of education: Not on file   • Highest education level: Not on file   Tobacco Use   • Smoking status: Former Smoker   • Smokeless tobacco: Never Used   • Tobacco comment: states quit cigarettes around 2018   Substance and Sexual Activity   • Alcohol use: Not Currently   • Drug use: Not Currently     Types: Methamphetamines, Marijuana     Comment: Stopped THC use in September 2020, stopped Meth. use in 2016   • Sexual activity: Yes     Partners: Male     Birth control/protection: None       Family History:  Family History   Problem Relation Age of Onset   • Bipolar disorder Mother    • Depression Mother    • No Known Problems Father    • Bipolar disorder Maternal Grandmother    • Dementia Paternal Grandmother        Past Surgical History:  Past Surgical History:   Procedure Laterality Date   • BREAST BIOPSY     • DILATATION AND CURETTAGE         Problem List:  There is no problem list on file for this patient.      Allergy:   Allergies   Allergen Reactions   • Abilify [Aripiprazole] Rash        Current Medications:   Current Outpatient Medications   Medication Sig Dispense Refill   • busPIRone (BUSPAR) 15 MG tablet Take 1 tablet by mouth 2 (Two) Times a Day. 60 tablet 0   • escitalopram (LEXAPRO) 20 MG tablet Take 1 tablet by mouth Daily. 30 tablet 0   • traZODone (DESYREL) 50 MG tablet Take 1 tablet by mouth Every Night. 30 tablet 0     No current facility-administered medications for this visit.          Review of Symptoms:    Review of Systems   Constitutional: Positive for activity change (decreased) and fatigue. Negative for  "appetite change, chills, fever and unexpected weight loss.   HENT: Negative.    Eyes: Negative.    Respiratory: Negative.    Cardiovascular: Negative.    Gastrointestinal: Negative.    Endocrine: Negative.    Genitourinary: Negative.    Musculoskeletal: Negative.    Skin: Negative.    Neurological: Negative.    Psychiatric/Behavioral: Positive for agitation, behavioral problems, decreased concentration, dysphoric mood, sleep disturbance, depressed mood and stress. Negative for hallucinations, self-injury, suicidal ideas and negative for hyperactivity. The patient is nervous/anxious.          Physical Exam:   Blood pressure 104/68, pulse 78, temperature 97.7 °F (36.5 °C), height 167.6 cm (66\"), weight 79.5 kg (175 lb 3.2 oz), SpO2 98 %. Body mass index is 28.28 kg/m².       Physical Exam  Vitals signs reviewed.   Constitutional:       Appearance: She is well-developed.   Neurological:      Mental Status: She is alert and oriented to person, place, and time.   Psychiatric:         Mood and Affect: Mood normal.         Speech: Speech normal.         Behavior: Behavior normal. Behavior is cooperative.         Thought Content: Thought content normal. Thought content does not include homicidal or suicidal ideation. Thought content does not include homicidal or suicidal plan.         Mental Status Exam:   Hygiene:   good  Cooperation:  Cooperative  Eye Contact:  Good  Psychomotor Behavior:  Appropriate  Affect:  Appropriate  Mood: normal  Hopelessness: Denies  Speech:  Normal  Thought Process:  Linear  Thought Content:  Normal  Suicidal:  None  Homicidal:  None  Hallucinations:  None  Delusion:  None  Memory:  Intact  Orientation:  Person, Place, Time and Situation  Reliability:  fair  Insight:  Poor  Judgement:  Impaired  Impulse Control:  Impaired  Physical/Medical Issues:  No        Lab Results:   No visits with results within 1 Month(s) from this visit.   Latest known visit with results is:   Lab on 05/08/2020 "   Component Date Value Ref Range Status   • Hepatitis C Quantitation 05/08/2020 HCV Not Detected  IU/mL Final   • Test Information 05/08/2020 Comment   Final    The quantitative range of this assay is 15 IU/mL to 100 million IU/mL.   • Glucose 05/08/2020 99  65 - 99 mg/dL Final   • BUN 05/08/2020 12  6 - 20 mg/dL Final   • Creatinine 05/08/2020 0.52* 0.57 - 1.00 mg/dL Final   • Sodium 05/08/2020 140  136 - 145 mmol/L Final   • Potassium 05/08/2020 3.8  3.5 - 5.2 mmol/L Final   • Chloride 05/08/2020 101  98 - 107 mmol/L Final   • CO2 05/08/2020 28.8  22.0 - 29.0 mmol/L Final   • Calcium 05/08/2020 9.2  8.6 - 10.5 mg/dL Final   • Total Protein 05/08/2020 6.8  6.0 - 8.5 g/dL Final   • Albumin 05/08/2020 4.20  3.50 - 5.20 g/dL Final   • ALT (SGPT) 05/08/2020 7  1 - 33 U/L Final   • AST (SGOT) 05/08/2020 14  1 - 32 U/L Final   • Alkaline Phosphatase 05/08/2020 83  39 - 117 U/L Final   • Total Bilirubin 05/08/2020 0.3  0.2 - 1.2 mg/dL Final   • eGFR Non African Amer 05/08/2020 146  >60 mL/min/1.73 Final   • Globulin 05/08/2020 2.6  gm/dL Final   • A/G Ratio 05/08/2020 1.6  g/dL Final   • BUN/Creatinine Ratio 05/08/2020 23.1  7.0 - 25.0 Final   • Anion Gap 05/08/2020 10.2  5.0 - 15.0 mmol/L Final         Assessment/Plan   Problems Addressed this Visit     None      Visit Diagnoses     Unspecified mood (affective) disorder (CMS/HCC)    -  Primary    Relevant Medications    busPIRone (BUSPAR) 15 MG tablet    escitalopram (LEXAPRO) 20 MG tablet    traZODone (DESYREL) 50 MG tablet    Anxiety disorder, unspecified type        Relevant Medications    busPIRone (BUSPAR) 15 MG tablet    escitalopram (LEXAPRO) 20 MG tablet    traZODone (DESYREL) 50 MG tablet    Sleeping difficulties        Relevant Medications    traZODone (DESYREL) 50 MG tablet      Diagnoses       Codes Comments    Unspecified mood (affective) disorder (CMS/[facility])    -  Primary ICD-10-CM: F39  ICD-9-CM: 296.90     Anxiety disorder, unspecified type     ICD-10-CM:  F41.9  ICD-9-CM: 300.00     Sleeping difficulties     ICD-10-CM: G47.9  ICD-9-CM: 780.50           Visit Diagnoses:    ICD-10-CM ICD-9-CM   1. Unspecified mood (affective) disorder (CMS/[facility])  F39 296.90   2. Anxiety disorder, unspecified type  F41.9 300.00   3. Sleeping difficulties  G47.9 780.50          GOALS:  Short Term Goals: Patient will be compliant with medication, and patient will have no significant medication related side effects.  Patient will be engaged in psychotherapy as indicated.  Patient will report subjective improvement of symptoms.  Long term goals: To stabilize mood and treat/improve subjective symptoms, the patient will stay out of the hospital, the patient will be at an optimal level of functioning, and the patient will take all medications as prescribed.  The patient verbalized understanding and agreement with goals that were mutually set.      TREATMENT PLAN: Continue supportive psychotherapy efforts and medications as indicated.  Continue Lexapro 20 mg by mouth once daily for mood.  Increase BuSpar to 15 mg by mouth twice daily for mood.  Discontinue Lamictal due to reported itching with use.  Start trazodone 50 mg by mouth once daily at bedtime for sleep and also mood.  Medication and treatment options, both pharmacological and non-pharmacological treatment options, discussed during today's visit, including off label usage of medication. Patient acknowledged and verbally consented with current treatment plan and was educated on the importance of compliance with treatment and follow-up appointments.        MEDICATION ISSUES:  Discussed medication options and treatment plan of prescribed medication, any off label use of medication, as well as the risks, benefits, any black box warnings including increased suicidality, and side effects including but not limited to potential falls, dizziness, possible impaired driving, GI side effects (change in appetite, abdominal discomfort, nausea,  vomiting, diarrhea, and/or constipation), dry mouth, somnolence, sedation, insomnia, activation, agitation, irritation, tremors, abnormal muscle movements or disorders, headache, sweating, possible bruising or rare bleeding, electrolyte and/or fluid abnormalities, change in blood pressure/heart rate/and or heart rhythm, sexual dysfunction, and metabolic adversities among others. Patient and/or guardian agreeable to call the office with any worsening of symptoms or onset of side effects, or if any concerns or questions arise.  The contact information for the office is made available to the patient and/or guardian.  Patient and/or guardian agreeable to call 911 or go to the nearest ER should they begin having any SI/HI, or if any urgent concerns arise. No medication side effects or related complaints today.      SUICIDE RISK ASSESSMENT: Unalterable demographics and a history of mental health intervention indicate this patient is in a high risk category compared to the general population. At present, the patient denies active SI/HI, intentions, or plans at this time and agrees to seek immediate care should such thoughts develop. The patient verbalizes understanding of how to access emergency care if needed and agrees to do so. Consideration of suicide risk and protective factors such as history, current presentation, individual strengths and weaknesses, psychosocial and environmental stressors and variables, psychiatric illness and symptoms, medical conditions and pain, took place in this interview. Based on those considerations, the patient is determined: within individual baseline and presenting no imminent risk for suicide or homicide. Other recommendations: The patient does not meet the criteria for inpatient admission and is not a safety risk to self or others at today's visit. Inpatient treatment offers no significant advantages over outpatient treatment for this patient at today's visit.      SAFETY  PLAN:  Patient was given ample time for questions and fully participated in treatment planning.  Patient was encouraged to call the clinic with any questions or concerns.  Patient was informed of access to emergency care. If patient were to develop any significant symptomatology, suicidal ideation, homicidal ideation, any concerns, or feel unsafe at any time they are to call the clinic and if unable to get immediate assistance should immediately call 911 or go to the nearest emergency room.  The patient is advised to remove or secure (lock away) all lethal weapons (including guns) and sharps (including razors, scissors, knives, etc.).  All medications (including any prescribed and any over the counter medications) should be stored in a safe and secured location that is not obtainable by children/adolescents.  Patient was given an opportunity and encouraged to ask questions about their medication, illness, and treatment. Patient contracted verbally for the following: If you are experiencing an emotional crisis or have thoughts of harming yourself or others, please go to your nearest local emergency room or call 911. Will continue to re-assess medication response and side effects frequently to establish efficacy and ensure safety. Risks, any black box warnings, side effects, off label usage, and benefits of medication and treatment discussed with patient, along with potential adverse side effects of current and/or newly prescribed medication, alternative treatment options, and OTC medications.  Patient verbalized understanding of potential risks, any off label use of medication, any black box warnings, and any side effects in their own words. The patient verbalized understanding and agreed to comply with the safety plan discussed in their own words.  Patient given the number to the office. Number also available to the 24- hour suicide hotline.        MEDS ORDERED DURING VISIT:  New Medications Ordered This Visit    Medications   • busPIRone (BUSPAR) 15 MG tablet     Sig: Take 1 tablet by mouth 2 (Two) Times a Day.     Dispense:  60 tablet     Refill:  0   • escitalopram (LEXAPRO) 20 MG tablet     Sig: Take 1 tablet by mouth Daily.     Dispense:  30 tablet     Refill:  0   • traZODone (DESYREL) 50 MG tablet     Sig: Take 1 tablet by mouth Every Night.     Dispense:  30 tablet     Refill:  0       Return in about 4 weeks (around 12/10/2020), or if symptoms worsen or fail to improve, for Recheck.       Functional Status: Moderate impairment     Prognosis: Guarded with Ongoing Treatment            This document has been electronically signed by AVERY Nettles  November 12, 2020 11:04 EST    Please note that portions of this note were completed with a voice recognition program. Efforts were made to edit dictation, but occasionally words are mistranscribed.

## 2020-12-03 DIAGNOSIS — G47.9 SLEEPING DIFFICULTIES: ICD-10-CM

## 2020-12-03 DIAGNOSIS — F41.9 ANXIETY DISORDER, UNSPECIFIED TYPE: ICD-10-CM

## 2020-12-03 DIAGNOSIS — F39 UNSPECIFIED MOOD (AFFECTIVE) DISORDER (HCC): ICD-10-CM

## 2020-12-03 RX ORDER — TRAZODONE HYDROCHLORIDE 50 MG/1
50 TABLET ORAL NIGHTLY
Qty: 30 TABLET | Refills: 0 | Status: SHIPPED | OUTPATIENT
Start: 2020-12-03 | End: 2021-01-12 | Stop reason: SDUPTHER

## 2020-12-04 DIAGNOSIS — F41.9 ANXIETY DISORDER, UNSPECIFIED TYPE: ICD-10-CM

## 2020-12-04 DIAGNOSIS — F39 UNSPECIFIED MOOD (AFFECTIVE) DISORDER (HCC): ICD-10-CM

## 2020-12-07 RX ORDER — BUSPIRONE HYDROCHLORIDE 15 MG/1
15 TABLET ORAL 2 TIMES DAILY
Qty: 60 TABLET | Refills: 0 | Status: SHIPPED | OUTPATIENT
Start: 2020-12-07 | End: 2021-01-12 | Stop reason: SDUPTHER

## 2020-12-07 RX ORDER — ESCITALOPRAM OXALATE 20 MG/1
20 TABLET ORAL DAILY
Qty: 30 TABLET | Refills: 0 | Status: SHIPPED | OUTPATIENT
Start: 2020-12-07 | End: 2021-01-12 | Stop reason: SDUPTHER

## 2021-01-12 ENCOUNTER — TELEMEDICINE (OUTPATIENT)
Dept: PSYCHIATRY | Facility: CLINIC | Age: 26
End: 2021-01-12

## 2021-01-12 DIAGNOSIS — F39 UNSPECIFIED MOOD (AFFECTIVE) DISORDER (HCC): Primary | Chronic | ICD-10-CM

## 2021-01-12 DIAGNOSIS — F41.9 ANXIETY DISORDER, UNSPECIFIED TYPE: Chronic | ICD-10-CM

## 2021-01-12 DIAGNOSIS — G47.9 SLEEPING DIFFICULTIES: ICD-10-CM

## 2021-01-12 PROCEDURE — 99214 OFFICE O/P EST MOD 30 MIN: CPT | Performed by: NURSE PRACTITIONER

## 2021-01-12 RX ORDER — OXCARBAZEPINE 150 MG/1
75 TABLET, FILM COATED ORAL
Qty: 15 TABLET | Refills: 0 | Status: SHIPPED | OUTPATIENT
Start: 2021-01-12 | End: 2021-02-20 | Stop reason: SDUPTHER

## 2021-01-12 RX ORDER — BUSPIRONE HYDROCHLORIDE 15 MG/1
15 TABLET ORAL 2 TIMES DAILY
Qty: 60 TABLET | Refills: 0 | Status: SHIPPED | OUTPATIENT
Start: 2021-01-12 | End: 2021-02-20 | Stop reason: SDUPTHER

## 2021-01-12 RX ORDER — TRAZODONE HYDROCHLORIDE 100 MG/1
100 TABLET ORAL NIGHTLY
Qty: 30 TABLET | Refills: 0 | Status: SHIPPED | OUTPATIENT
Start: 2021-01-12 | End: 2021-02-20 | Stop reason: SDUPTHER

## 2021-01-12 RX ORDER — ESCITALOPRAM OXALATE 20 MG/1
20 TABLET ORAL DAILY
Qty: 30 TABLET | Refills: 0 | Status: SHIPPED | OUTPATIENT
Start: 2021-01-12 | End: 2021-02-20 | Stop reason: SDUPTHER

## 2021-01-12 NOTE — PROGRESS NOTES
"This provider is located at the [facility] (through [facility]), [address] [place], [address] using a secure [employer] Video Visit through [employer]. Patient is being seen remotely via telehealth at their home address in [place], and stated they are in a secure environment for this session. The patient's condition being diagnosed/treated is appropriate for telemedicine. The provider identified herself as well as her credentials.   The patient, and/or patients guardian, consent to be seen remotely, and when consent is given they understand that the consent allows for patient identifiable information to be sent to a third party as needed.   They may refuse to be seen remotely at any time. The electronic data is encrypted and password protected, and the patient and/or guardian has been advised of the potential risks to privacy not withstanding such measures.    You have chosen to receive care through a telehealth visit.  Do you consent to use a video/audio connection for your medical care today? Yes          Subjective   Yoli Quijano is a 25 y.o. female who presents today for initial evaluation     Chief Complaint:  Depression and Anxiety    History of Present Illness:  Accompanied by: The patient is alone at today's encounter.  The patient describes her mood as \"about the same\" over the last few weeks.  The patient reports her appetite as good.  The patient reports her sleep as poor.  She states she only averages about 3-4 hours of sleep per night.  The patient denies any nightmares.  The patient denies any new medical problems or changes in medications since last appointment with this facility.  The patient reports compliance with current medication regimen.  The patient denies any current side effects from her current medication regimen.  The patient denies any abnormal muscle movements or tics.  The patient rates her depression at a 6/10 on a 0-10 scale, with 10 being " "the worst.  The patient states she has not had any motivation to do anything, and \"I could just stay in bed all day\".  The patient rates her anxiety at a 7-8/10 on a 0-10 scale, with 10 being the worst.  She states her anxiety is just something she feels all the time that never goes away.  She states she worries all the time.  The patient rates hopelessness at a 0/10 on a 0-10 scale with 10 being the worst.  The patient would like to adjust her medications at this visit.  This APRN has discussed several different medication options with the patient, and the patient states she feels like Lamictal helped in the past until she had to stop taking it.  The patient requests trying a mood stabilizer.  The patient denies any suicidal or homicidal ideations, plans, or intent at today's encounter and is convincing.  The patient denies any auditory hallucinations or visual hallucinations.  The patient does not endorse any significant symptoms consistent with huy or psychosis at today's encounter.  The patient states she is currently not in therapy, and declines at today's encounter.      Prior Psychiatric Medications:  The patient has taken:  Abilify - caused a rash about a week into treatment.  Seroquel - states she didn't take it long enough to notice any effects.  Remeron - States she think it helped, but isn't sure.  Zoloft - helped a little bit, but not much.  Prozac - ineffective.  Celexa - ineffective.  Lamictal - caused itching      Last Menstrual Period:  States just finished.  The patient is not breast feeding.  The patient was educated that her prescribed medications can have potential risk to a developing fetus. The patient is advised to contact this APRN/this office if she becomes pregnant or plans to become pregnant.  Pt verbalizes understanding and acknowledged agreement with this plan in her own words.  The patient states she has a history of 2 miscarriages, and after the birth of her daughter they told her " she would never be able to have any more children.        The following portions of the patient's history were reviewed and updated as appropriate: allergies, current medications, past family history, past medical history, past social history, past surgical history and problem list.            Past Medical History:  Past Medical History:   Diagnosis Date   • Anxiety    • Depression    • Infectious viral hepatitis     Hep C from IV drug use       Social History:  Social History     Socioeconomic History   • Marital status: Single     Spouse name: Not on file   • Number of children: Not on file   • Years of education: Not on file   • Highest education level: Not on file   Tobacco Use   • Smoking status: Former Smoker   • Smokeless tobacco: Never Used   • Tobacco comment: states quit cigarettes around 2018   Substance and Sexual Activity   • Alcohol use: Not Currently   • Drug use: Not Currently     Types: Methamphetamines, Marijuana     Comment: Stopped THC use in September 2020, stopped Meth. use in 2016   • Sexual activity: Yes     Partners: Male     Birth control/protection: None       Family History:  Family History   Problem Relation Age of Onset   • Bipolar disorder Mother    • Depression Mother    • No Known Problems Father    • Bipolar disorder Maternal Grandmother    • Dementia Paternal Grandmother        Past Surgical History:  Past Surgical History:   Procedure Laterality Date   • BREAST BIOPSY     • DILATATION AND CURETTAGE         Problem List:  There is no problem list on file for this patient.      Allergy:   Allergies   Allergen Reactions   • Abilify [Aripiprazole] Rash        Current Medications:   Current Outpatient Medications   Medication Sig Dispense Refill   • busPIRone (BUSPAR) 15 MG tablet Take 1 tablet by mouth 2 (Two) Times a Day. 60 tablet 0   • escitalopram (LEXAPRO) 20 MG tablet Take 1 tablet by mouth Daily. 30 tablet 0   • OXcarbazepine (Trileptal) 150 MG tablet Take 0.5 tablets by mouth  every night at bedtime. 15 tablet 0   • traZODone (DESYREL) 100 MG tablet Take 1 tablet by mouth Every Night. 30 tablet 0     No current facility-administered medications for this visit.          Review of Symptoms:    Review of Systems   Constitutional: Positive for activity change (decreased) and fatigue. Negative for appetite change, chills, fever and unexpected weight loss.   HENT: Negative.    Eyes: Negative.    Respiratory: Negative.    Cardiovascular: Negative.    Gastrointestinal: Negative.    Endocrine: Negative.    Genitourinary: Negative.    Musculoskeletal: Negative.    Skin: Negative.    Neurological: Negative.    Psychiatric/Behavioral: Positive for agitation, behavioral problems, decreased concentration, dysphoric mood, sleep disturbance, depressed mood and stress. Negative for hallucinations, self-injury, suicidal ideas and negative for hyperactivity. The patient is nervous/anxious.          Physical Exam:   There were no vitals taken for this visit. There is no height or weight on file to calculate BMI.   Due to the remote nature of this encounter (virtual encounter), vitals were unable to be obtained.  Height stated at 69.5 inches.  Weight stated at 176 pounds.        Physical Exam  Vitals signs reviewed.   Constitutional:       Appearance: She is well-developed.   Neurological:      Mental Status: She is alert and oriented to person, place, and time.   Psychiatric:         Attention and Perception: Attention normal.         Speech: Speech normal.         Behavior: Behavior is cooperative.         Thought Content: Thought content normal. Thought content does not include homicidal or suicidal ideation. Thought content does not include homicidal or suicidal plan.         Mental Status Exam:   Hygiene:   good  Cooperation:  Cooperative  Eye Contact:  Good  Psychomotor Behavior:  Appropriate  Affect:  Blunted  Mood: depressed  Hopelessness: Denies  Speech:  Normal  Thought Process:  Linear  Thought  Content:  Normal  Suicidal:  None  Homicidal:  None  Hallucinations:  None  Delusion:  None  Memory:  Intact  Orientation:  Person, Place, Time and Situation  Reliability:  fair  Insight:  Poor  Judgement:  Impaired  Impulse Control:  Impaired  Physical/Medical Issues:  No        Lab Results:   No visits with results within 1 Month(s) from this visit.   Latest known visit with results is:   Lab on 05/08/2020   Component Date Value Ref Range Status   • Hepatitis C Quantitation 05/08/2020 HCV Not Detected  IU/mL Final   • Test Information 05/08/2020 Comment   Final    The quantitative range of this assay is 15 IU/mL to 100 million IU/mL.   • Glucose 05/08/2020 99  65 - 99 mg/dL Final   • BUN 05/08/2020 12  6 - 20 mg/dL Final   • Creatinine 05/08/2020 0.52* 0.57 - 1.00 mg/dL Final   • Sodium 05/08/2020 140  136 - 145 mmol/L Final   • Potassium 05/08/2020 3.8  3.5 - 5.2 mmol/L Final   • Chloride 05/08/2020 101  98 - 107 mmol/L Final   • CO2 05/08/2020 28.8  22.0 - 29.0 mmol/L Final   • Calcium 05/08/2020 9.2  8.6 - 10.5 mg/dL Final   • Total Protein 05/08/2020 6.8  6.0 - 8.5 g/dL Final   • Albumin 05/08/2020 4.20  3.50 - 5.20 g/dL Final   • ALT (SGPT) 05/08/2020 7  1 - 33 U/L Final   • AST (SGOT) 05/08/2020 14  1 - 32 U/L Final   • Alkaline Phosphatase 05/08/2020 83  39 - 117 U/L Final   • Total Bilirubin 05/08/2020 0.3  0.2 - 1.2 mg/dL Final   • eGFR Non African Amer 05/08/2020 146  >60 mL/min/1.73 Final   • Globulin 05/08/2020 2.6  gm/dL Final   • A/G Ratio 05/08/2020 1.6  g/dL Final   • BUN/Creatinine Ratio 05/08/2020 23.1  7.0 - 25.0 Final   • Anion Gap 05/08/2020 10.2  5.0 - 15.0 mmol/L Final         Assessment/Plan   Problems Addressed this Visit     None      Visit Diagnoses     Unspecified mood (affective) disorder (CMS/HCC)  (Chronic)   -  Primary    Relevant Medications    busPIRone (BUSPAR) 15 MG tablet    escitalopram (LEXAPRO) 20 MG tablet    traZODone (DESYREL) 100 MG tablet    OXcarbazepine (Trileptal)  150 MG tablet    Anxiety disorder, unspecified type  (Chronic)       Relevant Medications    busPIRone (BUSPAR) 15 MG tablet    escitalopram (LEXAPRO) 20 MG tablet    traZODone (DESYREL) 100 MG tablet    Sleeping difficulties        Relevant Medications    traZODone (DESYREL) 100 MG tablet      Diagnoses       Codes Comments    Unspecified mood (affective) disorder (CMS/HCC)    -  Primary ICD-10-CM: F39  ICD-9-CM: 296.90     Anxiety disorder, unspecified type     ICD-10-CM: F41.9  ICD-9-CM: 300.00     Sleeping difficulties     ICD-10-CM: G47.9  ICD-9-CM: 780.50           Visit Diagnoses:    ICD-10-CM ICD-9-CM   1. Unspecified mood (affective) disorder (CMS/HCC)  F39 296.90   2. Anxiety disorder, unspecified type  F41.9 300.00   3. Sleeping difficulties  G47.9 780.50          GOALS:  Short Term Goals: Patient will be compliant with medication, and patient will have no significant medication related side effects.  Patient will be engaged in psychotherapy as indicated.  Patient will report subjective improvement of symptoms.  Long term goals: To stabilize mood and treat/improve subjective symptoms, the patient will stay out of the hospital, the patient will be at an optimal level of functioning, and the patient will take all medications as prescribed.  The patient verbalized understanding and agreement with goals that were mutually set.      TREATMENT PLAN: Continue supportive psychotherapy efforts and medications as indicated.  Continue Lexapro 20 mg by mouth once daily for mood.  Continue BuSpar 15 mg by mouth twice daily for mood.  Increase trazodone to 100 mg by mouth once daily at bedtime for sleep and also mood.  Start Trileptal 75 mg by mouth once daily at bedtime for mood.  Medication and treatment options, both pharmacological and non-pharmacological treatment options, discussed during today's visit, including off label usage of medication. Patient acknowledged and verbally consented with current treatment  plan and was educated on the importance of compliance with treatment and follow-up appointments.        MEDICATION ISSUES:  Discussed medication options and treatment plan of prescribed medication, any off label use of medication, as well as the risks, benefits, any black box warnings including increased suicidality, and side effects including but not limited to potential falls, dizziness, possible impaired driving, GI side effects (change in appetite, abdominal discomfort, nausea, vomiting, diarrhea, and/or constipation), dry mouth, somnolence, sedation, insomnia, activation, agitation, irritation, tremors, abnormal muscle movements or disorders, headache, sweating, possible bruising or rare bleeding, electrolyte and/or fluid abnormalities, change in blood pressure/heart rate/and or heart rhythm, sexual dysfunction, and metabolic adversities among others. Patient and/or guardian agreeable to call the office with any worsening of symptoms or onset of side effects, or if any concerns or questions arise.  The contact information for the office is made available to the patient and/or guardian.  Patient and/or guardian agreeable to call 911 or go to the nearest ER should they begin having any SI/HI, or if any urgent concerns arise. No medication side effects or related complaints today.      SUICIDE RISK ASSESSMENT: Unalterable demographics and a history of mental health intervention indicate this patient is in a high risk category compared to the general population. At present, the patient denies active SI/HI, intentions, or plans at this time and agrees to seek immediate care should such thoughts develop. The patient verbalizes understanding of how to access emergency care if needed and agrees to do so. Consideration of suicide risk and protective factors such as history, current presentation, individual strengths and weaknesses, psychosocial and environmental stressors and variables, psychiatric illness and symptoms,  medical conditions and pain, took place in this interview. Based on those considerations, the patient is determined: within individual baseline and presenting no imminent risk for suicide or homicide. Other recommendations: The patient does not meet the criteria for inpatient admission and is not a safety risk to self or others at today's visit. Inpatient treatment offers no significant advantages over outpatient treatment for this patient at today's visit.      SAFETY PLAN:  Patient was given ample time for questions and fully participated in treatment planning.  Patient was encouraged to call the clinic with any questions or concerns.  Patient was informed of access to emergency care. If patient were to develop any significant symptomatology, suicidal ideation, homicidal ideation, any concerns, or feel unsafe at any time they are to call the clinic and if unable to get immediate assistance should immediately call 911 or go to the nearest emergency room.  The patient is advised to remove or secure (lock away) all lethal weapons (including guns) and sharps (including razors, scissors, knives, etc.).  All medications (including any prescribed and any over the counter medications) should be stored in a safe and secured location that is not obtainable by children/adolescents.  Patient was given an opportunity and encouraged to ask questions about their medication, illness, and treatment. Patient contracted verbally for the following: If you are experiencing an emotional crisis or have thoughts of harming yourself or others, please go to your nearest local emergency room or call 911. Will continue to re-assess medication response and side effects frequently to establish efficacy and ensure safety. Risks, any black box warnings, side effects, off label usage, and benefits of medication and treatment discussed with patient, along with potential adverse side effects of current and/or newly prescribed medication,  alternative treatment options, and OTC medications.  Patient verbalized understanding of potential risks, any off label use of medication, any black box warnings, and any side effects in their own words. The patient verbalized understanding and agreed to comply with the safety plan discussed in their own words.  Patient given the number to the office. Number also available to the 24- hour suicide hotline.        MEDS ORDERED DURING VISIT:  New Medications Ordered This Visit   Medications   • busPIRone (BUSPAR) 15 MG tablet     Sig: Take 1 tablet by mouth 2 (Two) Times a Day.     Dispense:  60 tablet     Refill:  0   • escitalopram (LEXAPRO) 20 MG tablet     Sig: Take 1 tablet by mouth Daily.     Dispense:  30 tablet     Refill:  0   • traZODone (DESYREL) 100 MG tablet     Sig: Take 1 tablet by mouth Every Night.     Dispense:  30 tablet     Refill:  0   • OXcarbazepine (Trileptal) 150 MG tablet     Sig: Take 0.5 tablets by mouth every night at bedtime.     Dispense:  15 tablet     Refill:  0       Return in about 2 weeks (around 1/26/2021), or if symptoms worsen or fail to improve, for Recheck.       Functional Status: Moderate impairment     Prognosis: Guarded with Ongoing Treatment            This document has been electronically signed by AVERY Nettles  January 12, 2021 08:33 EST    Please note that portions of this note were completed with a voice recognition program. Efforts were made to edit dictation, but occasionally words are mistranscribed.

## 2021-02-20 DIAGNOSIS — F39 UNSPECIFIED MOOD (AFFECTIVE) DISORDER (HCC): Chronic | ICD-10-CM

## 2021-02-20 DIAGNOSIS — G47.9 SLEEPING DIFFICULTIES: ICD-10-CM

## 2021-02-20 DIAGNOSIS — F41.9 ANXIETY DISORDER, UNSPECIFIED TYPE: Chronic | ICD-10-CM

## 2021-02-22 RX ORDER — OXCARBAZEPINE 150 MG/1
75 TABLET, FILM COATED ORAL
Qty: 15 TABLET | Refills: 0 | Status: SHIPPED | OUTPATIENT
Start: 2021-02-22 | End: 2021-03-18 | Stop reason: SDUPTHER

## 2021-02-22 RX ORDER — ESCITALOPRAM OXALATE 20 MG/1
20 TABLET ORAL DAILY
Qty: 30 TABLET | Refills: 0 | Status: SHIPPED | OUTPATIENT
Start: 2021-02-22 | End: 2021-03-18 | Stop reason: SDUPTHER

## 2021-02-22 RX ORDER — TRAZODONE HYDROCHLORIDE 100 MG/1
100 TABLET ORAL NIGHTLY
Qty: 30 TABLET | Refills: 0 | Status: SHIPPED | OUTPATIENT
Start: 2021-02-22 | End: 2021-03-18 | Stop reason: SDUPTHER

## 2021-02-22 RX ORDER — BUSPIRONE HYDROCHLORIDE 15 MG/1
15 TABLET ORAL 2 TIMES DAILY
Qty: 60 TABLET | Refills: 0 | Status: SHIPPED | OUTPATIENT
Start: 2021-02-22 | End: 2021-03-18 | Stop reason: SDUPTHER

## 2021-02-22 NOTE — TELEPHONE ENCOUNTER
Patient requested refills on meds, but doesn't have a follow-up appointment scheduled.  I have sent in the refills this time, but if you could reach out to her to schedule a follow-up appointment at her convenience.  Thanks.

## 2021-03-18 ENCOUNTER — TELEMEDICINE (OUTPATIENT)
Dept: PSYCHIATRY | Facility: CLINIC | Age: 26
End: 2021-03-18

## 2021-03-18 DIAGNOSIS — G47.9 SLEEPING DIFFICULTIES: ICD-10-CM

## 2021-03-18 DIAGNOSIS — F41.9 ANXIETY DISORDER, UNSPECIFIED TYPE: Chronic | ICD-10-CM

## 2021-03-18 DIAGNOSIS — F39 UNSPECIFIED MOOD (AFFECTIVE) DISORDER (HCC): Primary | Chronic | ICD-10-CM

## 2021-03-18 PROCEDURE — 99214 OFFICE O/P EST MOD 30 MIN: CPT | Performed by: NURSE PRACTITIONER

## 2021-03-18 RX ORDER — TRAZODONE HYDROCHLORIDE 100 MG/1
100 TABLET ORAL NIGHTLY
Qty: 30 TABLET | Refills: 0 | Status: SHIPPED | OUTPATIENT
Start: 2021-03-18 | End: 2021-04-15 | Stop reason: SDUPTHER

## 2021-03-18 RX ORDER — OXCARBAZEPINE 150 MG/1
75 TABLET, FILM COATED ORAL
Qty: 15 TABLET | Refills: 0 | Status: SHIPPED | OUTPATIENT
Start: 2021-03-18 | End: 2021-04-15 | Stop reason: SDUPTHER

## 2021-03-18 RX ORDER — ESCITALOPRAM OXALATE 20 MG/1
20 TABLET ORAL DAILY
Qty: 30 TABLET | Refills: 0 | Status: SHIPPED | OUTPATIENT
Start: 2021-03-18 | End: 2021-04-15 | Stop reason: SDUPTHER

## 2021-03-18 RX ORDER — BUSPIRONE HYDROCHLORIDE 15 MG/1
15 TABLET ORAL 2 TIMES DAILY
Qty: 60 TABLET | Refills: 0 | Status: SHIPPED | OUTPATIENT
Start: 2021-03-18 | End: 2021-04-15 | Stop reason: SDUPTHER

## 2021-03-18 NOTE — PROGRESS NOTES
"This provider is located at the [facility] (through [facility]), [address], [facility], [address] using a secure [employer] Video Visit through [employer]. Patient is being seen remotely via telehealth at their home address in [place], and stated they are in a secure environment for this session. The patient's condition being diagnosed/treated is appropriate for telemedicine. The provider identified herself as well as her credentials.   The patient, and/or patients guardian, consent to be seen remotely, and when consent is given they understand that the consent allows for patient identifiable information to be sent to a third party as needed.   They may refuse to be seen remotely at any time. The electronic data is encrypted and password protected, and the patient and/or guardian has been advised of the potential risks to privacy not withstanding such measures.    You have chosen to receive care through a telehealth visit.  Do you consent to use a video/audio connection for your medical care today? Yes          Subjective   Yoli Quijano is a 25 y.o. female who presents today for follow up    Chief Complaint:  Depression, anxiety, and sleeping difficulties    Accompanied by: The patient is alone at today's encounter.    History of Present Illness:  The patient describes her mood as improved and \"I'm doing better\" over the last few weeks.  The patient states she feels better overall, and even her spouse agrees with this.  The patient reports her appetite as good.  The patient reports her sleep as good.  The patient denies any nightmares.  The patient denies any new medical problems or changes in medications since last appointment with this facility.  The patient reports compliance with current medication regimen.  The patient denies any current side effects from her current medication regimen.  The patient denies any abnormal muscle movements or tics.  The patient rates her " depression at a 1/10 on a 0-10 scale, with 10 being the worst.  The patient states she feels like doing more things now.  She states the family even went to [employer] last weekend and she enjoyed herself.  The patient rates her anxiety at a 4-5/10 on a 0-10 scale, with 10 being the worst.  The patient rates hopelessness at a 0/10 on a 0-10 scale with 10 being the worst.  The patient would like to not adjust or change her medications at this visit.  The patient denies any suicidal or homicidal ideations, plans, or intent at today's encounter and is convincing.  The patient denies any auditory hallucinations or visual hallucinations.  The patient does not endorse any significant symptoms consistent with huy or psychosis at today's encounter.  The patient states she is not in any therapy at this time, and is not interested in therapy at this time.      Prior Psychiatric Medications:  The patient has taken:  Abilify - caused a rash about a week into treatment.  Seroquel - states she didn't take it long enough to notice any effects.  Remeron - States she think it helped, but isn't sure.  Zoloft - helped a little bit, but not much.  Prozac - ineffective.  Celexa - ineffective.  Lamictal - caused itching      Last Menstrual Period:  3-4 weeks ago.  The patient is not breast feeding.  The patient was educated that her prescribed medications can have potential risk to a developing fetus. The patient is advised to contact this APRN/this office if she becomes pregnant or plans to become pregnant.  Pt verbalizes understanding and acknowledged agreement with this plan in her own words.  The patient states she has a history of 2 miscarriages, and after the birth of her daughter they told her she would never be able to have any more children.        The following portions of the patient's history were reviewed and updated as appropriate: allergies, current medications, past family history, past medical history, past social  history, past surgical history and problem list.            Past Medical History:  Past Medical History:   Diagnosis Date   • Anxiety    • Depression    • Infectious viral hepatitis     Hep C from IV drug use       Social History:  Social History     Socioeconomic History   • Marital status:      Spouse name: Not on file   • Number of children: Not on file   • Years of education: Not on file   • Highest education level: Not on file   Tobacco Use   • Smoking status: Former Smoker   • Smokeless tobacco: Never Used   • Tobacco comment: states quit cigarettes around 2018   Substance and Sexual Activity   • Alcohol use: Not Currently   • Drug use: Not Currently     Types: Methamphetamines, Marijuana     Comment: Stopped THC use in September 2020, stopped Meth. use in 2016   • Sexual activity: Yes     Partners: Male     Birth control/protection: None       Family History:  Family History   Problem Relation Age of Onset   • Bipolar disorder Mother    • Depression Mother    • No Known Problems Father    • Bipolar disorder Maternal Grandmother    • Dementia Paternal Grandmother        Past Surgical History:  Past Surgical History:   Procedure Laterality Date   • BREAST BIOPSY     • DILATATION AND CURETTAGE         Problem List:  There is no problem list on file for this patient.      Allergy:   Allergies   Allergen Reactions   • Abilify [Aripiprazole] Rash        Current Medications:   Current Outpatient Medications   Medication Sig Dispense Refill   • busPIRone (BUSPAR) 15 MG tablet Take 1 tablet by mouth 2 (Two) Times a Day. 60 tablet 0   • escitalopram (LEXAPRO) 20 MG tablet Take 1 tablet by mouth Daily. 30 tablet 0   • OXcarbazepine (Trileptal) 150 MG tablet Take 0.5 tablets by mouth every night at bedtime. 15 tablet 0   • traZODone (DESYREL) 100 MG tablet Take 1 tablet by mouth Every Night. 30 tablet 0     No current facility-administered medications for this visit.         Review of Symptoms:    Review of  Systems   Constitutional: Positive for fatigue. Negative for activity change, appetite change, chills, fever and unexpected weight loss.   HENT: Negative.    Eyes: Negative.    Respiratory: Negative.    Cardiovascular: Negative.    Gastrointestinal: Negative.    Endocrine: Negative.    Genitourinary: Negative.    Musculoskeletal: Negative.    Skin: Negative.    Neurological: Negative.    Psychiatric/Behavioral: Positive for agitation, behavioral problems, decreased concentration, sleep disturbance, depressed mood and stress. Negative for dysphoric mood, hallucinations, self-injury, suicidal ideas and negative for hyperactivity. The patient is nervous/anxious.          Physical Exam:   There were no vitals taken for this visit. There is no height or weight on file to calculate BMI.   Due to the remote nature of this encounter (virtual encounter), vitals were unable to be obtained.  Height stated at 69.5 inches.  Weight stated at 176 pounds.        Physical Exam  Constitutional:       Appearance: She is well-developed.   Neurological:      Mental Status: She is alert and oriented to person, place, and time.   Psychiatric:         Attention and Perception: Attention normal.         Mood and Affect: Mood and affect normal.         Speech: Speech normal.         Behavior: Behavior normal. Behavior is cooperative.         Thought Content: Thought content normal. Thought content does not include homicidal or suicidal ideation. Thought content does not include homicidal or suicidal plan.         Cognition and Memory: Cognition and memory normal.         Judgment: Judgment normal.         Mental Status Exam:   Hygiene:   good  Cooperation:  Cooperative  Eye Contact:  Good  Psychomotor Behavior:  Appropriate  Affect:  Appropriate  Mood: normal  Hopelessness: Denies  Speech:  Normal  Thought Process:  Linear  Thought Content:  Normal  Suicidal:  None  Homicidal:  None  Hallucinations:  None  Delusion:  None  Memory:   Intact  Orientation:  Person, Place, Time and Situation  Reliability:  fair  Insight:  Fair  Judgement:  Impaired  Impulse Control:  Impaired  Physical/Medical Issues:  No        PHQ-2 Depression Screening  Little interest or pleasure in doing things? 1   Feeling down, depressed, or hopeless? 1   PHQ-2 Total Score 2     PHQ-2 Total Score: 2      Lab Results:   No visits with results within 1 Month(s) from this visit.   Latest known visit with results is:   Lab on 05/08/2020   Component Date Value Ref Range Status   • Hepatitis C Quantitation 05/08/2020 HCV Not Detected  IU/mL Final   • Test Information 05/08/2020 Comment   Final    The quantitative range of this assay is 15 IU/mL to 100 million IU/mL.   • Glucose 05/08/2020 99  65 - 99 mg/dL Final   • BUN 05/08/2020 12  6 - 20 mg/dL Final   • Creatinine 05/08/2020 0.52* 0.57 - 1.00 mg/dL Final   • Sodium 05/08/2020 140  136 - 145 mmol/L Final   • Potassium 05/08/2020 3.8  3.5 - 5.2 mmol/L Final   • Chloride 05/08/2020 101  98 - 107 mmol/L Final   • CO2 05/08/2020 28.8  22.0 - 29.0 mmol/L Final   • Calcium 05/08/2020 9.2  8.6 - 10.5 mg/dL Final   • Total Protein 05/08/2020 6.8  6.0 - 8.5 g/dL Final   • Albumin 05/08/2020 4.20  3.50 - 5.20 g/dL Final   • ALT (SGPT) 05/08/2020 7  1 - 33 U/L Final   • AST (SGOT) 05/08/2020 14  1 - 32 U/L Final   • Alkaline Phosphatase 05/08/2020 83  39 - 117 U/L Final   • Total Bilirubin 05/08/2020 0.3  0.2 - 1.2 mg/dL Final   • eGFR Non African Amer 05/08/2020 146  >60 mL/min/1.73 Final   • Globulin 05/08/2020 2.6  gm/dL Final   • A/G Ratio 05/08/2020 1.6  g/dL Final   • BUN/Creatinine Ratio 05/08/2020 23.1  7.0 - 25.0 Final   • Anion Gap 05/08/2020 10.2  5.0 - 15.0 mmol/L Final         Assessment/Plan   Problems Addressed this Visit     None      Visit Diagnoses     Unspecified mood (affective) disorder (CMS/HCC)  (Chronic)   -  Primary    Relevant Medications    busPIRone (BUSPAR) 15 MG tablet    escitalopram (LEXAPRO) 20 MG tablet     OXcarbazepine (Trileptal) 150 MG tablet    traZODone (DESYREL) 100 MG tablet    Anxiety disorder, unspecified type  (Chronic)       Relevant Medications    busPIRone (BUSPAR) 15 MG tablet    escitalopram (LEXAPRO) 20 MG tablet    traZODone (DESYREL) 100 MG tablet    Sleeping difficulties        Relevant Medications    traZODone (DESYREL) 100 MG tablet      Diagnoses       Codes Comments    Unspecified mood (affective) disorder (CMS/[facility])    -  Primary ICD-10-CM: F39  ICD-9-CM: 296.90     Anxiety disorder, unspecified type     ICD-10-CM: F41.9  ICD-9-CM: 300.00     Sleeping difficulties     ICD-10-CM: G47.9  ICD-9-CM: 780.50           Visit Diagnoses:    ICD-10-CM ICD-9-CM   1. Unspecified mood (affective) disorder (CMS/HCC)  F39 296.90   2. Anxiety disorder, unspecified type  F41.9 300.00   3. Sleeping difficulties  G47.9 780.50          GOALS:  Short Term Goals: Patient will be compliant with medication, and patient will have no significant medication related side effects.  Patient will be engaged in psychotherapy as indicated.  Patient will report subjective improvement of symptoms.  Long term goals: To stabilize mood and treat/improve subjective symptoms, the patient will stay out of the hospital, the patient will be at an optimal level of functioning, and the patient will take all medications as prescribed.  The patient verbalized understanding and agreement with goals that were mutually set.      TREATMENT PLAN: Continue supportive psychotherapy efforts and medications as indicated.  Continue Lexapro 20 mg by mouth once daily for mood.  Continue BuSpar 15 mg by mouth twice daily for mood.  Continue trazodone 100 mg by mouth once daily at bedtime for sleep and also mood.  Continue Trileptal 75 mg by mouth once daily at bedtime for mood.  Medication and treatment options, both pharmacological and non-pharmacological treatment options, discussed during today's visit, including off label usage of medication.  Patient acknowledged and verbally consented with current treatment plan and was educated on the importance of compliance with treatment and follow-up appointments.        MEDICATION ISSUES:  Discussed medication options and treatment plan of prescribed medication, any off label use of medication, as well as the risks, benefits, any black box warnings including increased suicidality, and side effects including but not limited to potential falls, dizziness, possible impaired driving, GI side effects (change in appetite, abdominal discomfort, nausea, vomiting, diarrhea, and/or constipation), dry mouth, somnolence, sedation, insomnia, activation, agitation, irritation, tremors, abnormal muscle movements or disorders, headache, sweating, possible bruising or rare bleeding, electrolyte and/or fluid abnormalities, change in blood pressure/heart rate/and or heart rhythm, sexual dysfunction, and metabolic adversities among others. Patient and/or guardian agreeable to call the office with any worsening of symptoms or onset of side effects, or if any concerns or questions arise.  The contact information for the office is made available to the patient and/or guardian.  Patient and/or guardian agreeable to call 911 or go to the nearest ER should they begin having any SI/HI, or if any urgent concerns arise. No medication side effects or related complaints today.      SUICIDE RISK ASSESSMENT: Unalterable demographics and a history of mental health intervention indicate this patient is in a high risk category compared to the general population. At present, the patient denies active SI/HI, intentions, or plans at this time and agrees to seek immediate care should such thoughts develop. The patient verbalizes understanding of how to access emergency care if needed and agrees to do so. Consideration of suicide risk and protective factors such as history, current presentation, individual strengths and weaknesses, psychosocial and  environmental stressors and variables, psychiatric illness and symptoms, medical conditions and pain, took place in this interview. Based on those considerations, the patient is determined: within individual baseline and presenting no imminent risk for suicide or homicide. Other recommendations: The patient does not meet the criteria for inpatient admission and is not a safety risk to self or others at today's visit. Inpatient treatment offers no significant advantages over outpatient treatment for this patient at today's visit.      SAFETY PLAN:  Patient was given ample time for questions and fully participated in treatment planning.  Patient was encouraged to call the clinic with any questions or concerns.  Patient was informed of access to emergency care. If patient were to develop any significant symptomatology, suicidal ideation, homicidal ideation, any concerns, or feel unsafe at any time they are to call the clinic and if unable to get immediate assistance should immediately call 911 or go to the nearest emergency room.  The patient is advised to remove or secure (lock away) all lethal weapons (including guns) and sharps (including razors, scissors, knives, etc.).  All medications (including any prescribed and any over the counter medications) should be stored in a safe and secured location that is not obtainable by children/adolescents.  Patient was given an opportunity and encouraged to ask questions about their medication, illness, and treatment. Patient contracted verbally for the following: If you are experiencing an emotional crisis or have thoughts of harming yourself or others, please go to your nearest local emergency room or call 911. Will continue to re-assess medication response and side effects frequently to establish efficacy and ensure safety. Risks, any black box warnings, side effects, off label usage, and benefits of medication and treatment discussed with patient, along with potential  adverse side effects of current and/or newly prescribed medication, alternative treatment options, and OTC medications.  Patient verbalized understanding of potential risks, any off label use of medication, any black box warnings, and any side effects in their own words. The patient verbalized understanding and agreed to comply with the safety plan discussed in their own words.  Patient given the number to the office. Number also available to the 24- hour suicide hotline.          MEDS ORDERED DURING VISIT:  New Medications Ordered This Visit   Medications   • busPIRone (BUSPAR) 15 MG tablet     Sig: Take 1 tablet by mouth 2 (Two) Times a Day.     Dispense:  60 tablet     Refill:  0   • escitalopram (LEXAPRO) 20 MG tablet     Sig: Take 1 tablet by mouth Daily.     Dispense:  30 tablet     Refill:  0   • OXcarbazepine (Trileptal) 150 MG tablet     Sig: Take 0.5 tablets by mouth every night at bedtime.     Dispense:  15 tablet     Refill:  0   • traZODone (DESYREL) 100 MG tablet     Sig: Take 1 tablet by mouth Every Night.     Dispense:  30 tablet     Refill:  0       Return in about 4 weeks (around 4/15/2021), or if symptoms worsen or fail to improve, for Recheck.       Functional Status: Moderate impairment     Prognosis: Guarded with Ongoing Treatment            This document has been electronically signed by AVERY Nettles  March 18, 2021 11:51 EDT    Please note that portions of this note were completed with a voice recognition program. Efforts were made to edit dictation, but occasionally words are mistranscribed.

## 2021-03-23 ENCOUNTER — BULK ORDERING (OUTPATIENT)
Dept: CASE MANAGEMENT | Facility: OTHER | Age: 26
End: 2021-03-23

## 2021-03-23 DIAGNOSIS — Z23 IMMUNIZATION DUE: ICD-10-CM

## 2021-04-13 ENCOUNTER — TELEPHONE (OUTPATIENT)
Dept: PSYCHIATRY | Facility: CLINIC | Age: 26
End: 2021-04-13

## 2021-04-13 RX ORDER — HYDROXYZINE HYDROCHLORIDE 25 MG/1
25 TABLET, FILM COATED ORAL 3 TIMES DAILY PRN
Qty: 30 TABLET | Refills: 0 | Status: SHIPPED | OUTPATIENT
Start: 2021-04-13 | End: 2021-04-15 | Stop reason: SDUPTHER

## 2021-04-13 NOTE — TELEPHONE ENCOUNTER
PATIENT CALLED THE OFFICE CRYING SHE STATES SHE IS NEEDING SOMETHING FOR HER ANXIETY SHE CANT EAT , SLEEP OR DO ANYTHING .  PATIENT ALLI AN Si THOUGHTS     APRN response: A prn prescription of hydroxyzine has been sent in to the patient's pharmacy.  The patient has an appointment scheduled in 2 days, and is advised it is important that she keep this appointment.  Contact the office sooner if needed for any concerns.

## 2021-04-15 ENCOUNTER — TELEMEDICINE (OUTPATIENT)
Dept: PSYCHIATRY | Facility: CLINIC | Age: 26
End: 2021-04-15

## 2021-04-15 DIAGNOSIS — G47.9 SLEEPING DIFFICULTIES: ICD-10-CM

## 2021-04-15 DIAGNOSIS — F41.9 ANXIETY DISORDER, UNSPECIFIED TYPE: Chronic | ICD-10-CM

## 2021-04-15 DIAGNOSIS — F39 UNSPECIFIED MOOD (AFFECTIVE) DISORDER (HCC): Primary | Chronic | ICD-10-CM

## 2021-04-15 PROCEDURE — 99214 OFFICE O/P EST MOD 30 MIN: CPT | Performed by: NURSE PRACTITIONER

## 2021-04-15 RX ORDER — BUSPIRONE HYDROCHLORIDE 30 MG/1
30 TABLET ORAL 2 TIMES DAILY
Qty: 60 TABLET | Refills: 0 | Status: SHIPPED | OUTPATIENT
Start: 2021-04-15 | End: 2021-04-29 | Stop reason: SDUPTHER

## 2021-04-15 RX ORDER — ESCITALOPRAM OXALATE 20 MG/1
20 TABLET ORAL DAILY
Qty: 30 TABLET | Refills: 0 | Status: SHIPPED | OUTPATIENT
Start: 2021-04-15 | End: 2021-04-29 | Stop reason: SDUPTHER

## 2021-04-15 RX ORDER — TRAZODONE HYDROCHLORIDE 100 MG/1
100 TABLET ORAL NIGHTLY
Qty: 30 TABLET | Refills: 0 | Status: SHIPPED | OUTPATIENT
Start: 2021-04-15 | End: 2021-04-29 | Stop reason: SDUPTHER

## 2021-04-15 RX ORDER — HYDROXYZINE HYDROCHLORIDE 25 MG/1
25 TABLET, FILM COATED ORAL 3 TIMES DAILY PRN
Qty: 30 TABLET | Refills: 0 | Status: SHIPPED | OUTPATIENT
Start: 2021-04-15 | End: 2021-04-29 | Stop reason: SDUPTHER

## 2021-04-15 RX ORDER — OXCARBAZEPINE 150 MG/1
75 TABLET, FILM COATED ORAL
Qty: 15 TABLET | Refills: 0 | Status: SHIPPED | OUTPATIENT
Start: 2021-04-15 | End: 2021-04-29 | Stop reason: SDUPTHER

## 2021-04-15 NOTE — TREATMENT PLAN
Multi-Disciplinary Problems (from Behavioral Health Treatment Plan)    Active Problems     Problem: Mood Instability  Start Date: 04/15/21    Problem Details: The patient self-scales this problem as a 10 with 10 being the worst.        Goal Priority Start Date Expected End Date End Date    Patient will achieve mood stability as evidenced by controlled behavior and a more deliberate thought process -- 04/15/21 -- --    Goal Details: Progress toward goal:  Not appropriate to rate progress toward goal since this is the initial treatment plan.        Goal Intervention Frequency Start Date End Date    Provide structure and focus to patient's thoughts and actions by establishing plans and routine. Q Month 04/15/21 --    Intervention Details: Duration of treatment until until remission of symptoms.        Goal Intervention Frequency Start Date End Date    Assist patient in setting responsible goals and limits in behavior. Q Month 04/15/21 --    Intervention Details: Duration of treatment until until remission of symptoms.                           I have discussed and reviewed this treatment plan with the patient.  The patient has verbally agreed with this treatment plan (no signatures are obtained at today's visit as the patient is a telehealth patient and is unable to print and sign this document, therefore verbal agreement is obtained).

## 2021-04-15 NOTE — PROGRESS NOTES
"This provider is located at the [facility] (through [facility]), [address], [facility] [place], [address] using a secure [employer] Video Visit through [employer]. Patient is being seen remotely via telehealth at their home address in [place], and stated they are in a secure environment for this session. The patient's condition being diagnosed/treated is appropriate for telemedicine. The provider identified herself as well as her credentials.   The patient, and/or patients guardian, consent to be seen remotely, and when consent is given they understand that the consent allows for patient identifiable information to be sent to a third party as needed.   They may refuse to be seen remotely at any time. The electronic data is encrypted and password protected, and the patient and/or guardian has been advised of the potential risks to privacy not withstanding such measures.    You have chosen to receive care through a telehealth visit.  Do you consent to use a video/audio connection for your medical care today? Yes          Subjective   Yoli Quijano is a 25 y.o. female who presents today for follow up    Chief Complaint:  Depression, anxiety, and sleeping difficulties    Accompanied by: The patient is alone at today's encounter.    History of Present Illness:  The patient describes her mood as anxious over the last few weeks.  The patient states her  relapsed on meth recently, but he went to court this morning and is starting to sober up, so things are getting a little bit better.  She states her  is home right now, he got home last night,  and she is trying to help him with his sobriety, so she is home with him right now.  She states she did have to stay all night at her sister's house when he was home and hallucinating, before going to prison, and it was very difficult because there was not enough room for her and her child to sleep.  She states she feels \"like crazy\", her " "insides feel shaky, and this has had her moods all over the place.  She states this is ongoing with him, and he is constantly relapsing.  She states he will relapse every 6 months to 1 year, and it makes her feel hopeless for her child.  The patient states being around her  while he is recently been using meth has not caused any cravings for her as she has also had problems with methamphetamine in the past.  The patient states she also cleaned the house recently because she was afraid after her  was in care home for meth that  would end up at their house.  She states while she was cleaning she did find some drugs/drug paraphernalia that she had to flush down the toilet, but it has not caused any problems with her sobriety.  The patient reports her appetite as poor.  The patient reports her sleep as poor.  The patient denies any nightmares.  The patient denies any new medical problems or changes in medications since last appointment with this facility.  The patient reports compliance with current medication regimen.  The patient denies any current side effects from her current medication regimen.  The patient denies any abnormal muscle movements or tics.  The patient rates her depression at a 6-7/10 on a 0-10 scale, with 10 being the worst.  She states her depression is improving, but it is \"definitely still there\".  The patient rates her anxiety at a 10/10 on a 0-10 scale, with 10 being the worst.  She states she feels shaky, she can't eat, she can't sleep, and she is worried all the time.  The patient would like to increase her medications at this visit to help with her worsened moods, specifically her anxiety.  The patient states she has had no hypomania or huy.  The patient states she feels the Trileptal is stabilizing her mood well.  The patient denies any suicidal or homicidal ideations, plans, or intent at today's encounter and is convincing.  The patient denies any auditory " hallucinations or visual hallucinations.  The patient does not endorse any significant symptoms consistent with huy or psychosis at today's encounter.      Prior Psychiatric Medications:  Abilify - caused a rash about a week into treatment.  Seroquel - states she didn't take it long enough to notice any effects.  Remeron - States she think it helped, but isn't sure.  Zoloft - helped a little bit, but not much.  Prozac - ineffective.  Celexa - ineffective.  Lamictal - caused itching      Last Menstrual Period:  Currently Menstruating.  The patient is not breast feeding.  The patient was educated that her prescribed medications can have potential risk to a developing fetus. The patient is advised to contact this APRN/this office if she becomes pregnant or plans to become pregnant.  Pt verbalizes understanding and acknowledged agreement with this plan in her own words.  The patient states she has a history of 2 miscarriages, and after the birth of her daughter they told her she would never be able to have any more children.        The following portions of the patient's history were reviewed and updated as appropriate: allergies, current medications, past family history, past medical history, past social history, past surgical history and problem list.            Past Medical History:  Past Medical History:   Diagnosis Date   • Anxiety    • Depression    • Infectious viral hepatitis     Hep C from IV drug use       Social History:  Social History     Socioeconomic History   • Marital status:      Spouse name: Not on file   • Number of children: Not on file   • Years of education: Not on file   • Highest education level: Not on file   Tobacco Use   • Smoking status: Former Smoker   • Smokeless tobacco: Never Used   • Tobacco comment: states quit cigarettes around 2018   Substance and Sexual Activity   • Alcohol use: Not Currently   • Drug use: Not Currently     Types: Methamphetamines, Marijuana     Comment:  Stopped THC use in September 2020, stopped Meth. use in 2016   • Sexual activity: Yes     Partners: Male     Birth control/protection: None       Family History:  Family History   Problem Relation Age of Onset   • Bipolar disorder Mother    • Depression Mother    • No Known Problems Father    • Bipolar disorder Maternal Grandmother    • Dementia Paternal Grandmother        Past Surgical History:  Past Surgical History:   Procedure Laterality Date   • BREAST BIOPSY     • DILATATION AND CURETTAGE         Problem List:  There is no problem list on file for this patient.      Allergy:   Allergies   Allergen Reactions   • Abilify [Aripiprazole] Rash        Current Medications:   Current Outpatient Medications   Medication Sig Dispense Refill   • busPIRone (BUSPAR) 30 MG tablet Take 1 tablet by mouth 2 (Two) Times a Day. 60 tablet 0   • escitalopram (LEXAPRO) 20 MG tablet Take 1 tablet by mouth Daily. 30 tablet 0   • hydrOXYzine (ATARAX) 25 MG tablet Take 1 tablet by mouth 3 (Three) Times a Day As Needed (anxiety and/or sleep). 30 tablet 0   • OXcarbazepine (Trileptal) 150 MG tablet Take 0.5 tablets by mouth every night at bedtime. 15 tablet 0   • traZODone (DESYREL) 100 MG tablet Take 1 tablet by mouth Every Night. 30 tablet 0     No current facility-administered medications for this visit.         Review of Symptoms:    Review of Systems   Constitutional: Positive for activity change, appetite change and fatigue. Negative for chills, fever and unexpected weight loss.   HENT: Negative.    Eyes: Negative.    Respiratory: Negative.    Cardiovascular: Negative.    Gastrointestinal: Negative.    Endocrine: Negative.    Genitourinary: Negative.    Musculoskeletal: Negative.    Skin: Negative.    Neurological: Negative.    Psychiatric/Behavioral: Positive for agitation, behavioral problems, decreased concentration, sleep disturbance, depressed mood and stress. Negative for dysphoric mood, hallucinations, self-injury, suicidal  ideas and negative for hyperactivity. The patient is nervous/anxious.          Physical Exam:   There were no vitals taken for this visit. There is no height or weight on file to calculate BMI.   Due to the remote nature of this encounter (virtual encounter), vitals were unable to be obtained.  Height stated at 69.5 inches.  Weight stated at around 176 pounds.        Physical Exam  Constitutional:       Appearance: She is well-developed.   Neurological:      Mental Status: She is alert and oriented to person, place, and time.   Psychiatric:         Attention and Perception: Attention normal.         Mood and Affect: Affect normal. Mood is anxious.         Speech: Speech normal.         Behavior: Behavior normal. Behavior is cooperative.         Thought Content: Thought content normal. Thought content does not include homicidal or suicidal ideation. Thought content does not include homicidal or suicidal plan.         Cognition and Memory: Cognition and memory normal.         Judgment: Judgment normal.         Mental Status Exam:   Hygiene:   good  Cooperation:  Cooperative  Eye Contact:  Good  Psychomotor Behavior:  Appropriate  Affect:  Appropriate  Mood: anxious  Hopelessness: Denies  Speech:  Normal  Thought Process:  Linear  Thought Content:  Mood congruent  Suicidal:  None  Homicidal:  None  Hallucinations:  None  Delusion:  None  Memory:  Intact  Orientation:  Person, Place, Time and Situation  Reliability:  fair  Insight:  Fair  Judgement:  Impaired  Impulse Control:  Impaired  Physical/Medical Issues:  No        PHQ-2 Depression Screening  Little interest or pleasure in doing things? 2   Feeling down, depressed, or hopeless? 2   PHQ-2 Total Score 4     PHQ-2 Total Score: 4      Lab Results:   No visits with results within 1 Month(s) from this visit.   Latest known visit with results is:   Lab on 05/08/2020   Component Date Value Ref Range Status   • Hepatitis C Quantitation 05/08/2020 HCV Not Detected  IU/mL  Final   • Test Information 05/08/2020 Comment   Final    The quantitative range of this assay is 15 IU/mL to 100 million IU/mL.   • Glucose 05/08/2020 99  65 - 99 mg/dL Final   • BUN 05/08/2020 12  6 - 20 mg/dL Final   • Creatinine 05/08/2020 0.52* 0.57 - 1.00 mg/dL Final   • Sodium 05/08/2020 140  136 - 145 mmol/L Final   • Potassium 05/08/2020 3.8  3.5 - 5.2 mmol/L Final   • Chloride 05/08/2020 101  98 - 107 mmol/L Final   • CO2 05/08/2020 28.8  22.0 - 29.0 mmol/L Final   • Calcium 05/08/2020 9.2  8.6 - 10.5 mg/dL Final   • Total Protein 05/08/2020 6.8  6.0 - 8.5 g/dL Final   • Albumin 05/08/2020 4.20  3.50 - 5.20 g/dL Final   • ALT (SGPT) 05/08/2020 7  1 - 33 U/L Final   • AST (SGOT) 05/08/2020 14  1 - 32 U/L Final   • Alkaline Phosphatase 05/08/2020 83  39 - 117 U/L Final   • Total Bilirubin 05/08/2020 0.3  0.2 - 1.2 mg/dL Final   • eGFR Non African Amer 05/08/2020 146  >60 mL/min/1.73 Final   • Globulin 05/08/2020 2.6  gm/dL Final   • A/G Ratio 05/08/2020 1.6  g/dL Final   • BUN/Creatinine Ratio 05/08/2020 23.1  7.0 - 25.0 Final   • Anion Gap 05/08/2020 10.2  5.0 - 15.0 mmol/L Final         Assessment/Plan   Problems Addressed this Visit     None      Visit Diagnoses     Unspecified mood (affective) disorder (CMS/HCC)  (Chronic)   -  Primary    Relevant Medications    busPIRone (BUSPAR) 30 MG tablet    escitalopram (LEXAPRO) 20 MG tablet    hydrOXYzine (ATARAX) 25 MG tablet    OXcarbazepine (Trileptal) 150 MG tablet    traZODone (DESYREL) 100 MG tablet    Anxiety disorder, unspecified type  (Chronic)       Relevant Medications    busPIRone (BUSPAR) 30 MG tablet    escitalopram (LEXAPRO) 20 MG tablet    hydrOXYzine (ATARAX) 25 MG tablet    traZODone (DESYREL) 100 MG tablet    Sleeping difficulties        Relevant Medications    hydrOXYzine (ATARAX) 25 MG tablet    traZODone (DESYREL) 100 MG tablet      Diagnoses       Codes Comments    Unspecified mood (affective) disorder (CMS/HCC)    -  Primary ICD-10-CM:  F39  ICD-9-CM: 296.90     Anxiety disorder, unspecified type     ICD-10-CM: F41.9  ICD-9-CM: 300.00     Sleeping difficulties     ICD-10-CM: G47.9  ICD-9-CM: 780.50           Visit Diagnoses:    ICD-10-CM ICD-9-CM   1. Unspecified mood (affective) disorder (CMS/[facility])  F39 296.90   2. Anxiety disorder, unspecified type  F41.9 300.00   3. Sleeping difficulties  G47.9 780.50          GOALS:  Short Term Goals: Patient will be compliant with medication, and patient will have no significant medication related side effects.  Patient will be engaged in psychotherapy as indicated.  Patient will report subjective improvement of symptoms.  Long term goals: To stabilize mood and treat/improve subjective symptoms, the patient will stay out of the hospital, the patient will be at an optimal level of functioning, and the patient will take all medications as prescribed.  The patient verbalized understanding and agreement with goals that were mutually set.      TREATMENT PLAN: Start supportive psychotherapy efforts and medications as indicated.  Continue hydroxyzine 25 mg by mouth to take up to 3 times daily as needed for anxiety and or sleep.  Continue Lexapro 20 mg by mouth once daily for mood.  Increase BuSpar to 30 mg by mouth twice daily for mood.  Continue trazodone 100 mg by mouth once daily at bedtime for sleep and also mood.  Continue Trileptal 75 mg by mouth once daily at bedtime for mood.  Medication and treatment options, both pharmacological and non-pharmacological treatment options, discussed during today's visit, including off label usage of medication. Patient acknowledged and verbally consented with current treatment plan and was educated on the importance of compliance with treatment and follow-up appointments.      A referral to the behavioral health [facility] psychotherapy services are provided today's encounter.    MEDICATION ISSUES:  Discussed medication options and treatment plan of prescribed medication,  any off label use of medication, as well as the risks, benefits, any black box warnings including increased suicidality, and side effects including but not limited to potential falls, dizziness, possible impaired driving, GI side effects (change in appetite, abdominal discomfort, nausea, vomiting, diarrhea, and/or constipation), dry mouth, somnolence, sedation, insomnia, activation, agitation, irritation, tremors, abnormal muscle movements or disorders, headache, sweating, possible bruising or rare bleeding, electrolyte and/or fluid abnormalities, change in blood pressure/heart rate/and or heart rhythm, sexual dysfunction, and metabolic adversities among others. Patient and/or guardian agreeable to call the office with any worsening of symptoms or onset of side effects, or if any concerns or questions arise.  The contact information for the office is made available to the patient and/or guardian.  Patient and/or guardian agreeable to call 911 or go to the nearest ER should they begin having any SI/HI, or if any urgent concerns arise. No medication side effects or related complaints today.      SUICIDE RISK ASSESSMENT: Unalterable demographics and a history of mental health intervention indicate this patient is in a high risk category compared to the general population. At present, the patient denies active SI/HI, intentions, or plans at this time and agrees to seek immediate care should such thoughts develop. The patient verbalizes understanding of how to access emergency care if needed and agrees to do so. Consideration of suicide risk and protective factors such as history, current presentation, individual strengths and weaknesses, psychosocial and environmental stressors and variables, psychiatric illness and symptoms, medical conditions and pain, took place in this interview. Based on those considerations, the patient is determined: within individual baseline and presenting no imminent risk for suicide or  homicide. Other recommendations: The patient does not meet the criteria for inpatient admission and is not a safety risk to self or others at today's visit. Inpatient treatment offers no significant advantages over outpatient treatment for this patient at today's visit.      SAFETY PLAN:  Patient was given ample time for questions and fully participated in treatment planning.  Patient was encouraged to call the clinic with any questions or concerns.  Patient was informed of access to emergency care. If patient were to develop any significant symptomatology, suicidal ideation, homicidal ideation, any concerns, or feel unsafe at any time they are to call the clinic and if unable to get immediate assistance should immediately call 911 or go to the nearest emergency room.  The patient is advised to remove or secure (lock away) all lethal weapons (including guns) and sharps (including razors, scissors, knives, etc.).  All medications (including any prescribed and any over the counter medications) should be stored in a safe and secured location that is not obtainable by children/adolescents.  Patient was given an opportunity and encouraged to ask questions about their medication, illness, and treatment. Patient contracted verbally for the following: If you are experiencing an emotional crisis or have thoughts of harming yourself or others, please go to your nearest local emergency room or call 911. Will continue to re-assess medication response and side effects frequently to establish efficacy and ensure safety. Risks, any black box warnings, side effects, off label usage, and benefits of medication and treatment discussed with patient, along with potential adverse side effects of current and/or newly prescribed medication, alternative treatment options, and OTC medications.  Patient verbalized understanding of potential risks, any off label use of medication, any black box warnings, and any side effects in their own  words. The patient verbalized understanding and agreed to comply with the safety plan discussed in their own words.  Patient given the number to the office. Number also available to the 24- hour suicide hotline.          MEDS ORDERED DURING VISIT:  New Medications Ordered This Visit   Medications   • busPIRone (BUSPAR) 30 MG tablet     Sig: Take 1 tablet by mouth 2 (Two) Times a Day.     Dispense:  60 tablet     Refill:  0   • escitalopram (LEXAPRO) 20 MG tablet     Sig: Take 1 tablet by mouth Daily.     Dispense:  30 tablet     Refill:  0   • hydrOXYzine (ATARAX) 25 MG tablet     Sig: Take 1 tablet by mouth 3 (Three) Times a Day As Needed (anxiety and/or sleep).     Dispense:  30 tablet     Refill:  0   • OXcarbazepine (Trileptal) 150 MG tablet     Sig: Take 0.5 tablets by mouth every night at bedtime.     Dispense:  15 tablet     Refill:  0   • traZODone (DESYREL) 100 MG tablet     Sig: Take 1 tablet by mouth Every Night.     Dispense:  30 tablet     Refill:  0       Return in about 2 weeks (around 4/29/2021), or if symptoms worsen or fail to improve, for Recheck.        Progress Towards Goal: Not at goal    Functional Status: Moderate impairment     Prognosis: Guarded with Ongoing Treatment     Treatment plan completed: 4/15/21.            This document has been electronically signed by AVERY Nettles  April 15, 2021 18:24 EDT    Please note that portions of this note were completed with a voice recognition program. Efforts were made to edit dictation, but occasionally words are mistranscribed.

## 2021-04-29 ENCOUNTER — TELEMEDICINE (OUTPATIENT)
Dept: PSYCHIATRY | Facility: CLINIC | Age: 26
End: 2021-04-29

## 2021-04-29 DIAGNOSIS — F39 MOOD DISORDER (HCC): Primary | Chronic | ICD-10-CM

## 2021-04-29 DIAGNOSIS — F41.9 ANXIETY DISORDER, UNSPECIFIED TYPE: Chronic | ICD-10-CM

## 2021-04-29 DIAGNOSIS — G47.9 SLEEPING DIFFICULTIES: ICD-10-CM

## 2021-04-29 PROCEDURE — 99214 OFFICE O/P EST MOD 30 MIN: CPT | Performed by: NURSE PRACTITIONER

## 2021-04-29 RX ORDER — ESCITALOPRAM OXALATE 20 MG/1
20 TABLET ORAL DAILY
Qty: 30 TABLET | Refills: 0 | Status: SHIPPED | OUTPATIENT
Start: 2021-04-29 | End: 2021-06-04 | Stop reason: SDUPTHER

## 2021-04-29 RX ORDER — BUSPIRONE HYDROCHLORIDE 30 MG/1
30 TABLET ORAL 2 TIMES DAILY
Qty: 60 TABLET | Refills: 0 | Status: SHIPPED | OUTPATIENT
Start: 2021-04-29 | End: 2021-06-04 | Stop reason: SDUPTHER

## 2021-04-29 RX ORDER — HYDROXYZINE HYDROCHLORIDE 25 MG/1
25 TABLET, FILM COATED ORAL 3 TIMES DAILY PRN
Qty: 30 TABLET | Refills: 0 | Status: SHIPPED | OUTPATIENT
Start: 2021-04-29 | End: 2021-05-09 | Stop reason: SDUPTHER

## 2021-04-29 RX ORDER — TRAZODONE HYDROCHLORIDE 100 MG/1
100 TABLET ORAL NIGHTLY
Qty: 30 TABLET | Refills: 0 | Status: SHIPPED | OUTPATIENT
Start: 2021-04-29 | End: 2021-06-04 | Stop reason: SDUPTHER

## 2021-04-29 RX ORDER — OXCARBAZEPINE 150 MG/1
75 TABLET, FILM COATED ORAL
Qty: 15 TABLET | Refills: 0 | Status: SHIPPED | OUTPATIENT
Start: 2021-04-29 | End: 2021-06-04 | Stop reason: SDUPTHER

## 2021-04-29 NOTE — PROGRESS NOTES
"This provider is located at the [facility] (through [facility]), [address], [facility], [address] using a secure [employer] Video Visit through [employer]. Patient is being seen remotely via telehealth at their home address in [place], and stated they are in a secure environment for this session. The patient's condition being diagnosed/treated is appropriate for telemedicine. The provider identified herself as well as her credentials.   The patient, and/or patients guardian, consent to be seen remotely, and when consent is given they understand that the consent allows for patient identifiable information to be sent to a third party as needed.   They may refuse to be seen remotely at any time. The electronic data is encrypted and password protected, and the patient and/or guardian has been advised of the potential risks to privacy not withstanding such measures.    You have chosen to receive care through a telehealth visit.  Do you consent to use a video/audio connection for your medical care today? Yes          Subjective   Yoli Quijano is a 25 y.o. female who presents today for follow up    Chief Complaint:  Depression, anxiety, and sleeping difficulties    Accompanied by: The patient is alone at today's encounter.    History of Present Illness:  The patient describes her mood as \"they're good, they're not bad\" over the last few weeks.  The patient states her spouse is sober and things are doing a lot better at the home because of this.  The patient states her  refuses to seek out any treatment as he feels he can do this alone, but they are working on things.  The patient rates her depression at a 3-4/10 on a 0-10 scale, with 10 being the worst.  The patient states her symptoms of depression have improved.  The patient reports her symptoms of depression as being just wanting to sleep all the time and not get out of the bed.  The patient rates her anxiety at a 7-8/10 " "on a 0-10 scale, with 10 being the worst.  The patient reports her symptoms of anxiety as being worrying about her spouse and having weird dreams.  The patient reports her appetite as good.  The patient reports her sleep as diminshed.  The patient states she averages 7-8 hours of sleep each night.  She states she wakes up a lot at night, but also sleeps some during the day if she did not sleep good the previous night.  The patient denies nightmares, but reports she is having \"weird dreams\" such as her spouse being back on drugs or going back to [facility]. The patient denies any new medical problems or changes in medications since last appointment with this facility.  The patient reports compliance with current medication regimen.  The patient denies any current side effects from her current medication regimen.  The patient denies any abnormal muscle movements or tics.   The patient would like to not adjust or change her medications at this visit as she feels like she is at her typical baseline at this time.  The patient denies any suicidal or homicidal ideations, plans, or intent at today's encounter and is convincing.  The patient denies any auditory hallucinations or visual hallucinations.  The patient does not endorse any significant symptoms consistent with huy or psychosis at today's encounter.      Prior Psychiatric Medications:  Abilify - caused a rash about a week into treatment.  Seroquel - states she didn't take it long enough to notice any effects.  Remeron - States she think it helped, but isn't sure.  Zoloft - helped a little bit, but not much.  Prozac - ineffective.  Celexa - ineffective.  Lamictal - caused itching      Last Menstrual Period:  2 weeks ago.  The patient is not breast feeding.  The patient was educated that her prescribed medications can have potential risk to a developing fetus. The patient is advised to contact this APRN/this office if she becomes pregnant or plans to become pregnant.  Pt " verbalizes understanding and acknowledged agreement with this plan in her own words.  The patient states she has a history of 2 miscarriages, and after the birth of her daughter they told her she would never be able to have any more children.        The following portions of the patient's history were reviewed and updated as appropriate: allergies, current medications, past family history, past medical history, past social history, past surgical history and problem list.            Past Medical History:  Past Medical History:   Diagnosis Date   • Anxiety    • Depression    • Infectious viral hepatitis     Hep C from IV drug use       Social History:  Social History     Socioeconomic History   • Marital status:      Spouse name: Not on file   • Number of children: Not on file   • Years of education: Not on file   • Highest education level: Not on file   Tobacco Use   • Smoking status: Former Smoker   • Smokeless tobacco: Never Used   • Tobacco comment: states quit cigarettes around 2018   Substance and Sexual Activity   • Alcohol use: Not Currently   • Drug use: Not Currently     Types: Methamphetamines, Marijuana     Comment: Stopped THC use in September 2020, stopped Meth. use in 2016   • Sexual activity: Yes     Partners: Male     Birth control/protection: None       Family History:  Family History   Problem Relation Age of Onset   • Bipolar disorder Mother    • Depression Mother    • No Known Problems Father    • Bipolar disorder Maternal Grandmother    • Dementia Paternal Grandmother        Past Surgical History:  Past Surgical History:   Procedure Laterality Date   • BREAST BIOPSY     • DILATATION AND CURETTAGE         Problem List:  There is no problem list on file for this patient.      Allergy:   Allergies   Allergen Reactions   • Abilify [Aripiprazole] Rash        Current Medications:   Current Outpatient Medications   Medication Sig Dispense Refill   • busPIRone (BUSPAR) 30 MG tablet Take 1 tablet  by mouth 2 (Two) Times a Day. 60 tablet 0   • escitalopram (LEXAPRO) 20 MG tablet Take 1 tablet by mouth Daily. 30 tablet 0   • hydrOXYzine (ATARAX) 25 MG tablet Take 1 tablet by mouth 3 (Three) Times a Day As Needed (anxiety and/or sleep). 30 tablet 0   • OXcarbazepine (Trileptal) 150 MG tablet Take 0.5 tablets by mouth every night at bedtime. 15 tablet 0   • traZODone (DESYREL) 100 MG tablet Take 1 tablet by mouth Every Night. 30 tablet 0     No current facility-administered medications for this visit.         Review of Symptoms:    Review of Systems   Constitutional: Positive for activity change, appetite change and fatigue. Negative for chills, diaphoresis, fever, unexpected weight gain and unexpected weight loss.   HENT: Negative.    Eyes: Negative.    Respiratory: Negative.    Cardiovascular: Negative.    Gastrointestinal: Negative.    Endocrine: Negative.    Genitourinary: Negative.    Musculoskeletal: Negative.    Skin: Negative.    Neurological: Negative.    Psychiatric/Behavioral: Positive for agitation, behavioral problems, decreased concentration, sleep disturbance, depressed mood and stress. Negative for hallucinations, self-injury, suicidal ideas and negative for hyperactivity. The patient is nervous/anxious.          Physical Exam:   There were no vitals taken for this visit. There is no height or weight on file to calculate BMI.   Due to the remote nature of this encounter (virtual encounter), vitals were unable to be obtained.  Height stated at 69.5 inches.  Weight stated at around 176 pounds.        Physical Exam  Constitutional:       Appearance: She is well-developed.   Neurological:      Mental Status: She is alert and oriented to person, place, and time.   Psychiatric:         Attention and Perception: Attention normal.         Mood and Affect: Mood and affect normal.         Speech: Speech normal.         Behavior: Behavior normal. Behavior is cooperative.         Thought Content: Thought  content normal. Thought content does not include homicidal or suicidal ideation. Thought content does not include homicidal or suicidal plan.         Cognition and Memory: Cognition and memory normal.         Judgment: Judgment normal.         Mental Status Exam:   Hygiene:   good  Cooperation:  Cooperative  Eye Contact:  Good  Psychomotor Behavior:  Appropriate  Affect:  Appropriate  Mood: normal  Hopelessness: Denies  Speech:  Normal  Thought Process:  Linear  Thought Content:  Mood congruent  Suicidal:  None  Homicidal:  None  Hallucinations:  None  Delusion:  None  Memory:  Intact  Orientation:  Person, Place, Time and Situation  Reliability:  fair  Insight:  Fair  Judgement:  Fair  Impulse Control:  Fair  Physical/Medical Issues:  No            Lab Results:   No visits with results within 1 Month(s) from this visit.   Latest known visit with results is:   Lab on 05/08/2020   Component Date Value Ref Range Status   • Hepatitis C Quantitation 05/08/2020 HCV Not Detected  IU/mL Final   • Test Information 05/08/2020 Comment   Final    The quantitative range of this assay is 15 IU/mL to 100 million IU/mL.   • Glucose 05/08/2020 99  65 - 99 mg/dL Final   • BUN 05/08/2020 12  6 - 20 mg/dL Final   • Creatinine 05/08/2020 0.52* 0.57 - 1.00 mg/dL Final   • Sodium 05/08/2020 140  136 - 145 mmol/L Final   • Potassium 05/08/2020 3.8  3.5 - 5.2 mmol/L Final   • Chloride 05/08/2020 101  98 - 107 mmol/L Final   • CO2 05/08/2020 28.8  22.0 - 29.0 mmol/L Final   • Calcium 05/08/2020 9.2  8.6 - 10.5 mg/dL Final   • Total Protein 05/08/2020 6.8  6.0 - 8.5 g/dL Final   • Albumin 05/08/2020 4.20  3.50 - 5.20 g/dL Final   • ALT (SGPT) 05/08/2020 7  1 - 33 U/L Final   • AST (SGOT) 05/08/2020 14  1 - 32 U/L Final   • Alkaline Phosphatase 05/08/2020 83  39 - 117 U/L Final   • Total Bilirubin 05/08/2020 0.3  0.2 - 1.2 mg/dL Final   • eGFR Non African Amer 05/08/2020 146  >60 mL/min/1.73 Final   • Globulin 05/08/2020 2.6  gm/dL Final   •  A/G Ratio 05/08/2020 1.6  g/dL Final   • BUN/Creatinine Ratio 05/08/2020 23.1  7.0 - 25.0 Final   • Anion Gap 05/08/2020 10.2  5.0 - 15.0 mmol/L Final         Assessment/Plan   Problems Addressed this Visit     None      Visit Diagnoses     Mood disorder (CMS/[facility])  (Chronic)   -  Primary    Relevant Medications    busPIRone (BUSPAR) 30 MG tablet    escitalopram (LEXAPRO) 20 MG tablet    hydrOXYzine (ATARAX) 25 MG tablet    OXcarbazepine (Trileptal) 150 MG tablet    traZODone (DESYREL) 100 MG tablet    Anxiety disorder, unspecified type  (Chronic)       Relevant Medications    busPIRone (BUSPAR) 30 MG tablet    escitalopram (LEXAPRO) 20 MG tablet    hydrOXYzine (ATARAX) 25 MG tablet    traZODone (DESYREL) 100 MG tablet    Sleeping difficulties        Relevant Medications    hydrOXYzine (ATARAX) 25 MG tablet    traZODone (DESYREL) 100 MG tablet      Diagnoses       Codes Comments    Mood disorder (CMS/[facility])    -  Primary ICD-10-CM: F39  ICD-9-CM: 296.90     Anxiety disorder, unspecified type     ICD-10-CM: F41.9  ICD-9-CM: 300.00     Sleeping difficulties     ICD-10-CM: G47.9  ICD-9-CM: 780.50           Visit Diagnoses:    ICD-10-CM ICD-9-CM   1. Mood disorder (CMS/[facility])  F39 296.90   2. Anxiety disorder, unspecified type  F41.9 300.00   3. Sleeping difficulties  G47.9 780.50          GOALS:  Short Term Goals: Patient will be compliant with medication, and patient will have no significant medication related side effects.  Patient will be engaged in psychotherapy as indicated.  Patient will report subjective improvement of symptoms.  Long term goals: To stabilize mood and treat/improve subjective symptoms, the patient will stay out of the hospital, the patient will be at an optimal level of functioning, and the patient will take all medications as prescribed.  The patient verbalized understanding and agreement with goals that were mutually set.      TREATMENT PLAN: Start supportive psychotherapy efforts and medications  as indicated.  Medication and treatment options, both pharmacological and non-pharmacological treatment options, discussed during today's visit, including off label usage of medication. Patient acknowledged and verbally consented with current treatment plan and was educated on the importance of compliance with treatment and follow-up appointments.    -Continue hydroxyzine 25 mg by mouth to take up to 3 times daily as needed for anxiety and or sleep.  -Continue Lexapro 20 mg by mouth once daily for mood.  -Continue BuSpar 30 mg by mouth twice daily for mood.  -Continue trazodone 100 mg by mouth once daily at bedtime for sleep and also mood.  -Continue Trileptal 75 mg by mouth once daily at bedtime for mood.    -The patient states nobody ever reached out to her about starting psychotherapy and she requests to start psychotherapy at today's encounter.  A request for staff to reach out to the patient to schedule an appointment for psychotherapy has been placed at today's encounter.      MEDICATION ISSUES:  Discussed medication options and treatment plan of prescribed medication, any off label use of medication, as well as the risks, benefits, any black box warnings including increased suicidality, and side effects including but not limited to potential falls, dizziness, possible impaired driving, GI side effects (change in appetite, abdominal discomfort, nausea, vomiting, diarrhea, and/or constipation), dry mouth, somnolence, sedation, insomnia, activation, agitation, irritation, tremors, abnormal muscle movements or disorders, headache, sweating, possible bruising or rare bleeding, electrolyte and/or fluid abnormalities, change in blood pressure/heart rate/and or heart rhythm, sexual dysfunction, and metabolic adversities among others. Patient and/or guardian agreeable to call the office with any worsening of symptoms or onset of side effects, or if any concerns or questions arise.  The contact information for the  office is made available to the patient and/or guardian.  Patient and/or guardian agreeable to call 911 or go to the nearest ER should they begin having any SI/HI, or if any urgent concerns arise. No medication side effects or related complaints today.      SUICIDE RISK ASSESSMENT: Unalterable demographics and a history of mental health intervention indicate this patient is in a high risk category compared to the general population. At present, the patient denies active SI/HI, intentions, or plans at this time and agrees to seek immediate care should such thoughts develop. The patient verbalizes understanding of how to access emergency care if needed and agrees to do so. Consideration of suicide risk and protective factors such as history, current presentation, individual strengths and weaknesses, psychosocial and environmental stressors and variables, psychiatric illness and symptoms, medical conditions and pain, took place in this interview. Based on those considerations, the patient is determined: within individual baseline and presenting no imminent risk for suicide or homicide. Other recommendations: The patient does not meet the criteria for inpatient admission and is not a safety risk to self or others at today's visit. Inpatient treatment offers no significant advantages over outpatient treatment for this patient at today's visit.      SAFETY PLAN:  Patient was given ample time for questions and fully participated in treatment planning.  Patient was encouraged to call the clinic with any questions or concerns.  Patient was informed of access to emergency care. If patient were to develop any significant symptomatology, suicidal ideation, homicidal ideation, any concerns, or feel unsafe at any time they are to call the clinic and if unable to get immediate assistance should immediately call 911 or go to the nearest emergency room.  The patient is advised to remove or secure (lock away) all lethal weapons  (including guns) and sharps (including razors, scissors, knives, etc.).  All medications (including any prescribed and any over the counter medications) should be stored in a safe and secured location that is not obtainable by children/adolescents.  Patient was given an opportunity and encouraged to ask questions about their medication, illness, and treatment. Patient contracted verbally for the following: If you are experiencing an emotional crisis or have thoughts of harming yourself or others, please go to your nearest local emergency room or call 911. Will continue to re-assess medication response and side effects frequently to establish efficacy and ensure safety. Risks, any black box warnings, side effects, off label usage, and benefits of medication and treatment discussed with patient, along with potential adverse side effects of current and/or newly prescribed medication, alternative treatment options, and OTC medications.  Patient verbalized understanding of potential risks, any off label use of medication, any black box warnings, and any side effects in their own words. The patient verbalized understanding and agreed to comply with the safety plan discussed in their own words.  Patient given the number to the office. Number also available to the 24- hour suicide hotline.          MEDS ORDERED DURING VISIT:  New Medications Ordered This Visit   Medications   • busPIRone (BUSPAR) 30 MG tablet     Sig: Take 1 tablet by mouth 2 (Two) Times a Day.     Dispense:  60 tablet     Refill:  0   • escitalopram (LEXAPRO) 20 MG tablet     Sig: Take 1 tablet by mouth Daily.     Dispense:  30 tablet     Refill:  0   • hydrOXYzine (ATARAX) 25 MG tablet     Sig: Take 1 tablet by mouth 3 (Three) Times a Day As Needed (anxiety and/or sleep).     Dispense:  30 tablet     Refill:  0   • OXcarbazepine (Trileptal) 150 MG tablet     Sig: Take 0.5 tablets by mouth every night at bedtime.     Dispense:  15 tablet     Refill:  0   •  traZODone (DESYREL) 100 MG tablet     Sig: Take 1 tablet by mouth Every Night.     Dispense:  30 tablet     Refill:  0       Return in about 4 weeks (around 5/27/2021), or if symptoms worsen or fail to improve, for Next scheduled follow up and Recheck.        Progress Towards Goal: Not at goal    Functional Status: Moderate impairment     Prognosis: Guarded with Ongoing Treatment     Treatment plan completed: 4/15/21.            This document has been electronically signed by AVERY Nettles  April 29, 2021 11:20 EDT    Please note that portions of this note were completed with a voice recognition program. Efforts were made to edit dictation, but occasionally words are mistranscribed.

## 2021-05-09 DIAGNOSIS — G47.9 SLEEPING DIFFICULTIES: ICD-10-CM

## 2021-05-09 DIAGNOSIS — F41.9 ANXIETY DISORDER, UNSPECIFIED TYPE: Chronic | ICD-10-CM

## 2021-05-10 RX ORDER — HYDROXYZINE HYDROCHLORIDE 25 MG/1
25 TABLET, FILM COATED ORAL 3 TIMES DAILY PRN
Qty: 30 TABLET | Refills: 0 | Status: SHIPPED | OUTPATIENT
Start: 2021-05-10 | End: 2021-06-04 | Stop reason: SDUPTHER

## 2021-06-04 DIAGNOSIS — F41.9 ANXIETY DISORDER, UNSPECIFIED TYPE: Chronic | ICD-10-CM

## 2021-06-04 DIAGNOSIS — G47.9 SLEEPING DIFFICULTIES: ICD-10-CM

## 2021-06-04 DIAGNOSIS — F39 MOOD DISORDER (HCC): Chronic | ICD-10-CM

## 2021-06-07 RX ORDER — ESCITALOPRAM OXALATE 20 MG/1
20 TABLET ORAL DAILY
Qty: 30 TABLET | Refills: 0 | Status: SHIPPED | OUTPATIENT
Start: 2021-06-07 | End: 2021-07-15 | Stop reason: SDUPTHER

## 2021-06-07 RX ORDER — BUSPIRONE HYDROCHLORIDE 30 MG/1
30 TABLET ORAL 2 TIMES DAILY
Qty: 60 TABLET | Refills: 0 | Status: SHIPPED | OUTPATIENT
Start: 2021-06-07 | End: 2021-07-15 | Stop reason: SDUPTHER

## 2021-06-07 RX ORDER — TRAZODONE HYDROCHLORIDE 100 MG/1
100 TABLET ORAL NIGHTLY
Qty: 30 TABLET | Refills: 0 | Status: SHIPPED | OUTPATIENT
Start: 2021-06-07 | End: 2021-08-23 | Stop reason: SDUPTHER

## 2021-06-07 RX ORDER — HYDROXYZINE HYDROCHLORIDE 25 MG/1
25 TABLET, FILM COATED ORAL 3 TIMES DAILY PRN
Qty: 30 TABLET | Refills: 0 | Status: SHIPPED | OUTPATIENT
Start: 2021-06-07 | End: 2021-07-15 | Stop reason: SDUPTHER

## 2021-06-07 RX ORDER — OXCARBAZEPINE 150 MG/1
75 TABLET, FILM COATED ORAL
Qty: 15 TABLET | Refills: 0 | Status: SHIPPED | OUTPATIENT
Start: 2021-06-07 | End: 2021-07-15 | Stop reason: SDUPTHER

## 2021-06-07 NOTE — TELEPHONE ENCOUNTER
I received a refill request for this patient, but the patient does not have a follow-up appointment scheduled.  A refill will be sent in this time, but the patient needs to be seen.  Can you please call and schedule a follow-up appointment for the patient at their convenience?  Thanks.

## 2021-07-15 DIAGNOSIS — F39 MOOD DISORDER (HCC): Chronic | ICD-10-CM

## 2021-07-15 DIAGNOSIS — G47.9 SLEEPING DIFFICULTIES: ICD-10-CM

## 2021-07-15 DIAGNOSIS — F41.9 ANXIETY DISORDER, UNSPECIFIED TYPE: Chronic | ICD-10-CM

## 2021-07-15 RX ORDER — OXCARBAZEPINE 150 MG/1
75 TABLET, FILM COATED ORAL
Qty: 15 TABLET | Refills: 0 | Status: SHIPPED | OUTPATIENT
Start: 2021-07-15 | End: 2021-08-23 | Stop reason: SDUPTHER

## 2021-07-15 RX ORDER — ESCITALOPRAM OXALATE 20 MG/1
20 TABLET ORAL DAILY
Qty: 30 TABLET | Refills: 0 | Status: SHIPPED | OUTPATIENT
Start: 2021-07-15 | End: 2021-08-23 | Stop reason: SDUPTHER

## 2021-07-15 RX ORDER — HYDROXYZINE HYDROCHLORIDE 25 MG/1
25 TABLET, FILM COATED ORAL 3 TIMES DAILY PRN
Qty: 30 TABLET | Refills: 0 | Status: SHIPPED | OUTPATIENT
Start: 2021-07-15 | End: 2021-08-23 | Stop reason: SDUPTHER

## 2021-07-15 RX ORDER — BUSPIRONE HYDROCHLORIDE 30 MG/1
30 TABLET ORAL 2 TIMES DAILY
Qty: 60 TABLET | Refills: 0 | Status: SHIPPED | OUTPATIENT
Start: 2021-07-15 | End: 2021-08-23 | Stop reason: SDUPTHER

## 2021-08-18 DIAGNOSIS — F41.9 ANXIETY DISORDER, UNSPECIFIED TYPE: Chronic | ICD-10-CM

## 2021-08-18 DIAGNOSIS — G47.9 SLEEPING DIFFICULTIES: ICD-10-CM

## 2021-08-18 DIAGNOSIS — F39 MOOD DISORDER (HCC): Chronic | ICD-10-CM

## 2021-08-18 RX ORDER — BUSPIRONE HYDROCHLORIDE 30 MG/1
30 TABLET ORAL 2 TIMES DAILY
Qty: 60 TABLET | Refills: 0 | Status: CANCELLED | OUTPATIENT
Start: 2021-08-18

## 2021-08-18 RX ORDER — OXCARBAZEPINE 150 MG/1
75 TABLET, FILM COATED ORAL
Qty: 15 TABLET | Refills: 0 | Status: CANCELLED | OUTPATIENT
Start: 2021-08-18

## 2021-08-18 RX ORDER — HYDROXYZINE HYDROCHLORIDE 25 MG/1
25 TABLET, FILM COATED ORAL 3 TIMES DAILY PRN
Qty: 30 TABLET | Refills: 0 | Status: CANCELLED | OUTPATIENT
Start: 2021-08-18

## 2021-08-18 RX ORDER — ESCITALOPRAM OXALATE 20 MG/1
20 TABLET ORAL DAILY
Qty: 30 TABLET | Refills: 0 | Status: CANCELLED | OUTPATIENT
Start: 2021-08-18

## 2021-08-18 NOTE — TELEPHONE ENCOUNTER
I received a refill request for this patient, but the patient does not have a follow-up appointment scheduled. Can you please call and schedule a follow-up appointment for the patient at their convenience before refills can be sent in?  Thanks.

## 2021-08-19 ENCOUNTER — PATIENT MESSAGE (OUTPATIENT)
Dept: PSYCHIATRY | Facility: CLINIC | Age: 26
End: 2021-08-19

## 2021-08-19 DIAGNOSIS — F41.9 ANXIETY DISORDER, UNSPECIFIED TYPE: Chronic | ICD-10-CM

## 2021-08-19 DIAGNOSIS — G47.9 SLEEPING DIFFICULTIES: ICD-10-CM

## 2021-08-19 DIAGNOSIS — F39 MOOD DISORDER (HCC): Chronic | ICD-10-CM

## 2021-08-23 RX ORDER — TRAZODONE HYDROCHLORIDE 100 MG/1
100 TABLET ORAL NIGHTLY
Qty: 30 TABLET | Refills: 0 | Status: SHIPPED | OUTPATIENT
Start: 2021-08-23 | End: 2021-10-07 | Stop reason: SDUPTHER

## 2021-08-23 RX ORDER — BUSPIRONE HYDROCHLORIDE 30 MG/1
30 TABLET ORAL 2 TIMES DAILY
Qty: 60 TABLET | Refills: 0 | Status: SHIPPED | OUTPATIENT
Start: 2021-08-23 | End: 2021-09-24 | Stop reason: SDUPTHER

## 2021-08-23 RX ORDER — OXCARBAZEPINE 150 MG/1
75 TABLET, FILM COATED ORAL
Qty: 15 TABLET | Refills: 0 | Status: SHIPPED | OUTPATIENT
Start: 2021-08-23 | End: 2021-09-24 | Stop reason: SDUPTHER

## 2021-08-23 RX ORDER — HYDROXYZINE HYDROCHLORIDE 25 MG/1
25 TABLET, FILM COATED ORAL 3 TIMES DAILY PRN
Qty: 30 TABLET | Refills: 0 | Status: SHIPPED | OUTPATIENT
Start: 2021-08-23 | End: 2021-10-07 | Stop reason: SDUPTHER

## 2021-08-23 RX ORDER — ESCITALOPRAM OXALATE 20 MG/1
20 TABLET ORAL DAILY
Qty: 30 TABLET | Refills: 0 | Status: SHIPPED | OUTPATIENT
Start: 2021-08-23 | End: 2021-09-24 | Stop reason: SDUPTHER

## 2021-09-24 DIAGNOSIS — F39 MOOD DISORDER (HCC): Chronic | ICD-10-CM

## 2021-09-24 DIAGNOSIS — F41.9 ANXIETY DISORDER, UNSPECIFIED TYPE: Chronic | ICD-10-CM

## 2021-09-27 RX ORDER — ESCITALOPRAM OXALATE 20 MG/1
20 TABLET ORAL DAILY
Qty: 30 TABLET | Refills: 0 | Status: SHIPPED | OUTPATIENT
Start: 2021-09-27 | End: 2021-10-07 | Stop reason: SDUPTHER

## 2021-09-27 RX ORDER — OXCARBAZEPINE 150 MG/1
75 TABLET, FILM COATED ORAL
Qty: 15 TABLET | Refills: 0 | Status: SHIPPED | OUTPATIENT
Start: 2021-09-27 | End: 2021-10-07 | Stop reason: SDUPTHER

## 2021-09-27 RX ORDER — BUSPIRONE HYDROCHLORIDE 30 MG/1
30 TABLET ORAL 2 TIMES DAILY
Qty: 60 TABLET | Refills: 0 | Status: SHIPPED | OUTPATIENT
Start: 2021-09-27 | End: 2021-10-07 | Stop reason: SDUPTHER

## 2021-10-07 ENCOUNTER — TELEMEDICINE (OUTPATIENT)
Dept: PSYCHIATRY | Facility: CLINIC | Age: 26
End: 2021-10-07

## 2021-10-07 DIAGNOSIS — G47.9 SLEEPING DIFFICULTIES: ICD-10-CM

## 2021-10-07 DIAGNOSIS — F41.9 ANXIETY DISORDER, UNSPECIFIED TYPE: Chronic | ICD-10-CM

## 2021-10-07 DIAGNOSIS — F39 MOOD DISORDER (HCC): Primary | Chronic | ICD-10-CM

## 2021-10-07 PROCEDURE — 99214 OFFICE O/P EST MOD 30 MIN: CPT | Performed by: NURSE PRACTITIONER

## 2021-10-07 RX ORDER — OXCARBAZEPINE 150 MG/1
75 TABLET, FILM COATED ORAL
Qty: 15 TABLET | Refills: 1 | Status: SHIPPED | OUTPATIENT
Start: 2021-10-07 | End: 2021-12-10 | Stop reason: SDUPTHER

## 2021-10-07 RX ORDER — HYDROXYZINE HYDROCHLORIDE 25 MG/1
25 TABLET, FILM COATED ORAL 3 TIMES DAILY PRN
Qty: 30 TABLET | Refills: 1 | Status: SHIPPED | OUTPATIENT
Start: 2021-10-07 | End: 2021-11-16 | Stop reason: SDUPTHER

## 2021-10-07 RX ORDER — TRAZODONE HYDROCHLORIDE 100 MG/1
100 TABLET ORAL NIGHTLY
Qty: 30 TABLET | Refills: 1 | Status: SHIPPED | OUTPATIENT
Start: 2021-10-07 | End: 2022-03-24

## 2021-10-07 RX ORDER — BUSPIRONE HYDROCHLORIDE 30 MG/1
30 TABLET ORAL 2 TIMES DAILY
Qty: 60 TABLET | Refills: 1 | Status: SHIPPED | OUTPATIENT
Start: 2021-10-07 | End: 2022-03-01 | Stop reason: SDUPTHER

## 2021-10-07 RX ORDER — ESCITALOPRAM OXALATE 20 MG/1
20 TABLET ORAL DAILY
Qty: 30 TABLET | Refills: 1 | Status: SHIPPED | OUTPATIENT
Start: 2021-10-07 | End: 2021-12-21 | Stop reason: SDUPTHER

## 2021-10-07 NOTE — PROGRESS NOTES
This provider is located at the [facility] (through [facility]), [address], [place], [address] using a secure [employer] Video Visit through [employer]. Patient is being seen remotely via telehealth at their home address in [place], and stated they are in a secure environment for this session. The patient's condition being diagnosed/treated is appropriate for telemedicine. The provider identified herself as well as her credentials.   The patient, and/or patients guardian, consent to be seen remotely, and when consent is given they understand that the consent allows for patient identifiable information to be sent to a third party as needed.   They may refuse to be seen remotely at any time. The electronic data is encrypted and password protected, and the patient and/or guardian has been advised of the potential risks to privacy not withstanding such measures.    You have chosen to receive care through a telehealth visit.  Do you consent to use a video/audio connection for your medical care today? Yes          Subjective   Yoli Quijano is a 26 y.o. female who presents today for follow up    Chief Complaint:  Depression, anxiety, and sleeping difficulties    Accompanied by: The patient is alone at today's encounter.    History of Present Illness:  The patient describes her mood as good over the last few weeks.  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  She states she also recently started classes, and is planning to do nursing.  The patient rates her depression at a 2-3/10 on a 0-10 scale, with 10 being the worst.  The patient reports her symptoms of depression as being feeling of sadness or feeling down.  The patient rates her anxiety at a 4-5/10 on a 0-10 scale, with 10 being the worst.  The patient reports her symptoms of anxiety as being worry.  The patient  reports her appetite as good.  The patient reports her sleep as decreased due to caring for her child.  The patient states she averages 3-4 hours of sleep each night.  The patient denies nightmares. The patient denies any new medical problems or changes in medications since last appointment with this facility.  The patient reports compliance with current medication regimen.  The patient denies any current side effects from her current medication regimen.  The patient denies any abnormal muscle movements or tics.   The patient would like to not adjust or change her medications at this visit as she states she is doing pretty good at this time and at her typical baseline.  The patient states her spouse is doing well at this time, is sober, and is in therapy.  She states her and her daughter are back in the home with him, and it is a safe environment at this time, and there are no drugs in the household.  The patient denies any suicidal or homicidal ideations, plans, or intent at today's encounter and is convincing.  The patient denies any auditory hallucinations or visual hallucinations.  The patient does not endorse any significant symptoms consistent with huy or psychosis at today's encounter.      Prior Psychiatric Medications:  Abilify - caused a rash about a week into treatment.  Seroquel - states she didn't take it long enough to notice any effects.  Remeron - States she think it helped, but isn't sure.  Zoloft - helped a little bit, but not much.  Prozac - ineffective.  Celexa - ineffective.  Lamictal - caused itching  Hydroxyzine  Trazodone  Buspar  Lexapro  Trileptal      Last Menstrual Period:  1 week ago.  The patient is not breast feeding.  The patient was educated that her prescribed medications can have potential risk to a developing fetus. The patient is advised to contact this APRN/this office if she becomes pregnant or plans to become pregnant.  Pt verbalizes understanding and acknowledged agreement  with this plan in her own words.  The patient states she has a history of 2 miscarriages, and after the birth of her daughter they told her she would never be able to have any more children.        The following portions of the patient's history were reviewed and updated as appropriate: allergies, current medications, past family history, past medical history, past social history, past surgical history and problem list.            Past Medical History:  Past Medical History:   Diagnosis Date   • Anxiety    • Depression    • Infectious viral hepatitis     Hep C from IV drug use       Social History:  Social History     Socioeconomic History   • Marital status:      Spouse name: Not on file   • Number of children: Not on file   • Years of education: Not on file   • Highest education level: Not on file   Tobacco Use   • Smoking status: Former Smoker   • Smokeless tobacco: Never Used   • Tobacco comment: states quit cigarettes around 2018   Substance and Sexual Activity   • Alcohol use: Not Currently   • Drug use: Not Currently     Types: Methamphetamines, Marijuana     Comment: Stopped THC use in September 2020, stopped Meth. use in 2016   • Sexual activity: Yes     Partners: Male     Birth control/protection: None       Family History:  Family History   Problem Relation Age of Onset   • Bipolar disorder Mother    • Depression Mother    • No Known Problems Father    • Bipolar disorder Maternal Grandmother    • Dementia Paternal Grandmother        Past Surgical History:  Past Surgical History:   Procedure Laterality Date   • BREAST BIOPSY     • DILATATION AND CURETTAGE         Problem List:  There is no problem list on file for this patient.      Allergy:   Allergies   Allergen Reactions   • Abilify [Aripiprazole] Rash        Current Medications:   Current Outpatient Medications   Medication Sig Dispense Refill   • busPIRone (BUSPAR) 30 MG tablet Take 1 tablet by mouth 2 (Two) Times a Day. 60 tablet 1   •  escitalopram (LEXAPRO) 20 MG tablet Take 1 tablet by mouth Daily. 30 tablet 1   • hydrOXYzine (ATARAX) 25 MG tablet Take 1 tablet by mouth 3 (Three) Times a Day As Needed (anxiety and/or sleep). 30 tablet 1   • OXcarbazepine (Trileptal) 150 MG tablet Take 0.5 tablets by mouth every night at bedtime. 15 tablet 1   • traZODone (DESYREL) 100 MG tablet Take 1 tablet by mouth Every Night. 30 tablet 1     No current facility-administered medications for this visit.         Review of Symptoms:    Review of Systems   Constitutional: Negative.    HENT: Negative.    Eyes: Negative.    Respiratory: Negative.    Cardiovascular: Negative.    Gastrointestinal: Negative.    Endocrine: Negative.    Genitourinary: Negative.    Musculoskeletal: Negative.    Skin: Negative.    Neurological: Negative.    Psychiatric/Behavioral: Positive for agitation, decreased concentration, depressed mood and stress. Negative for behavioral problems, dysphoric mood, hallucinations, self-injury, suicidal ideas and negative for hyperactivity. The patient is nervous/anxious.          Physical Exam:   There were no vitals taken for this visit. There is no height or weight on file to calculate BMI.   Due to the remote nature of this encounter (virtual encounter), vitals were unable to be obtained.  Height stated at 69.5 inches.  Weight stated at around 187 pounds.        Physical Exam  Constitutional:       Appearance: She is well-developed.   Neurological:      Mental Status: She is alert and oriented to person, place, and time.   Psychiatric:         Attention and Perception: Attention normal.         Mood and Affect: Mood and affect normal.         Speech: Speech normal.         Behavior: Behavior normal. Behavior is cooperative.         Thought Content: Thought content normal. Thought content does not include homicidal or suicidal ideation. Thought content does not include homicidal or suicidal plan.         Cognition and Memory: Cognition and memory  normal.         Judgment: Judgment normal.         Mental Status Exam:   Hygiene:   good  Cooperation:  Cooperative  Eye Contact:  Good  Psychomotor Behavior:  Appropriate  Affect:  Appropriate  Mood: normal  Hopelessness: Denies  Speech:  Normal  Thought Process:  Linear  Thought Content:  Mood congruent  Suicidal:  None  Homicidal:  None  Hallucinations:  None  Delusion:  None  Memory:  Intact  Orientation:  Person, Place, Time and Situation  Reliability:  good  Insight:  Good  Judgement:  Good  Impulse Control:  Good  Physical/Medical Issues:  No            Lab Results:   No visits with results within 1 Month(s) from this visit.   Latest known visit with results is:   Lab on 05/08/2020   Component Date Value Ref Range Status   • Hepatitis C Quantitation 05/08/2020 HCV Not Detected  IU/mL Final   • Test Information 05/08/2020 Comment   Final    The quantitative range of this assay is 15 IU/mL to 100 million IU/mL.   • Glucose 05/08/2020 99  65 - 99 mg/dL Final   • BUN 05/08/2020 12  6 - 20 mg/dL Final   • Creatinine 05/08/2020 0.52* 0.57 - 1.00 mg/dL Final   • Sodium 05/08/2020 140  136 - 145 mmol/L Final   • Potassium 05/08/2020 3.8  3.5 - 5.2 mmol/L Final   • Chloride 05/08/2020 101  98 - 107 mmol/L Final   • CO2 05/08/2020 28.8  22.0 - 29.0 mmol/L Final   • Calcium 05/08/2020 9.2  8.6 - 10.5 mg/dL Final   • Total Protein 05/08/2020 6.8  6.0 - 8.5 g/dL Final   • Albumin 05/08/2020 4.20  3.50 - 5.20 g/dL Final   • ALT (SGPT) 05/08/2020 7  1 - 33 U/L Final   • AST (SGOT) 05/08/2020 14  1 - 32 U/L Final   • Alkaline Phosphatase 05/08/2020 83  39 - 117 U/L Final   • Total Bilirubin 05/08/2020 0.3  0.2 - 1.2 mg/dL Final   • eGFR Non African Amer 05/08/2020 146  >60 mL/min/1.73 Final   • Globulin 05/08/2020 2.6  gm/dL Final   • A/G Ratio 05/08/2020 1.6  g/dL Final   • BUN/Creatinine Ratio 05/08/2020 23.1  7.0 - 25.0 Final   • Anion Gap 05/08/2020 10.2  5.0 - 15.0 mmol/L Final         Assessment/Plan   Problems  Addressed this Visit     None      Visit Diagnoses     Mood disorder (HCC)  (Chronic)   -  Primary    Relevant Medications    hydrOXYzine (ATARAX) 25 MG tablet    traZODone (DESYREL) 100 MG tablet    busPIRone (BUSPAR) 30 MG tablet    escitalopram (LEXAPRO) 20 MG tablet    OXcarbazepine (Trileptal) 150 MG tablet    Anxiety disorder, unspecified type  (Chronic)       Relevant Medications    hydrOXYzine (ATARAX) 25 MG tablet    traZODone (DESYREL) 100 MG tablet    busPIRone (BUSPAR) 30 MG tablet    escitalopram (LEXAPRO) 20 MG tablet    Sleeping difficulties        Relevant Medications    hydrOXYzine (ATARAX) 25 MG tablet    traZODone (DESYREL) 100 MG tablet      Diagnoses       Codes Comments    Mood disorder (HCC)    -  Primary ICD-10-CM: F39  ICD-9-CM: 296.90     Anxiety disorder, unspecified type     ICD-10-CM: F41.9  ICD-9-CM: 300.00     Sleeping difficulties     ICD-10-CM: G47.9  ICD-9-CM: 780.50           Visit Diagnoses:    ICD-10-CM ICD-9-CM   1. Mood disorder (HCC)  F39 296.90   2. Anxiety disorder, unspecified type  F41.9 300.00   3. Sleeping difficulties  G47.9 780.50          GOALS:  Short Term Goals: Patient will be compliant with medication, and patient will have no significant medication related side effects.  Patient will be engaged in psychotherapy as indicated.  Patient will report subjective improvement of symptoms.  Long term goals: To stabilize mood and treat/improve subjective symptoms, the patient will stay out of the hospital, the patient will be at an optimal level of functioning, and the patient will take all medications as prescribed.  The patient verbalized understanding and agreement with goals that were mutually set.      TREATMENT PLAN: Start supportive psychotherapy efforts and medications as indicated.  Medication and treatment options, both pharmacological and non-pharmacological treatment options, discussed during today's visit, including off label usage of medication. Patient  acknowledged and verbally consented with current treatment plan and was educated on the importance of compliance with treatment and follow-up appointments.      -Continue hydroxyzine 25 mg by mouth to take up to 3 times daily as needed for anxiety and or sleep.  -Continue Lexapro 20 mg by mouth once daily for mood.  -Continue BuSpar 30 mg by mouth twice daily for mood.  -Continue trazodone 100 mg by mouth once daily at bedtime for sleep and also mood.  -Continue Trileptal 75 mg by mouth once daily at bedtime for mood.        MEDICATION ISSUES:  Discussed medication options and treatment plan of prescribed medication, any off label use of medication, as well as the risks, benefits, any black box warnings including increased suicidality, and side effects including but not limited to potential falls, dizziness, possible impaired driving, GI side effects (change in appetite, abdominal discomfort, nausea, vomiting, diarrhea, and/or constipation), dry mouth, somnolence, sedation, insomnia, activation, agitation, irritation, tremors, abnormal muscle movements or disorders, headache, sweating, possible bruising or rare bleeding, electrolyte and/or fluid abnormalities, change in blood pressure/heart rate/and or heart rhythm, sexual dysfunction, and metabolic adversities among others. Patient and/or guardian agreeable to call the office with any worsening of symptoms or onset of side effects, or if any concerns or questions arise.  The contact information for the office is made available to the patient and/or guardian.  Patient and/or guardian agreeable to call 911 or go to the nearest ER should they begin having any SI/HI, or if any urgent concerns arise. No medication side effects or related complaints today.      SUICIDE RISK ASSESSMENT: Unalterable demographics and a history of mental health intervention indicate this patient is in a high risk category compared to the general population. At present, the patient denies  active SI/HI, intentions, or plans at this time and agrees to seek immediate care should such thoughts develop. The patient verbalizes understanding of how to access emergency care if needed and agrees to do so. Consideration of suicide risk and protective factors such as history, current presentation, individual strengths and weaknesses, psychosocial and environmental stressors and variables, psychiatric illness and symptoms, medical conditions and pain, took place in this interview. Based on those considerations, the patient is determined: within individual baseline and presenting no imminent risk for suicide or homicide. Other recommendations: The patient does not meet the criteria for inpatient admission and is not a safety risk to self or others at today's visit. Inpatient treatment offers no significant advantages over outpatient treatment for this patient at today's visit.      SAFETY PLAN:  Patient was given ample time for questions and fully participated in treatment planning.  Patient was encouraged to call the clinic with any questions or concerns.  Patient was informed of access to emergency care. If patient were to develop any significant symptomatology, suicidal ideation, homicidal ideation, any concerns, or feel unsafe at any time they are to call the clinic and if unable to get immediate assistance should immediately call 911 or go to the nearest emergency room.  The patient is advised to remove or secure (lock away) all lethal weapons (including guns) and sharps (including razors, scissors, knives, etc.).  All medications (including any prescribed and any over the counter medications) should be stored in a safe and secured location that is not obtainable by children/adolescents.  Patient was given an opportunity and encouraged to ask questions about their medication, illness, and treatment. Patient contracted verbally for the following: If you are experiencing an emotional crisis or have thoughts of  harming yourself or others, please go to your nearest local emergency room or call 911. Will continue to re-assess medication response and side effects frequently to establish efficacy and ensure safety. Risks, any black box warnings, side effects, off label usage, and benefits of medication and treatment discussed with patient, along with potential adverse side effects of current and/or newly prescribed medication, alternative treatment options, and OTC medications.  Patient verbalized understanding of potential risks, any off label use of medication, any black box warnings, and any side effects in their own words. The patient verbalized understanding and agreed to comply with the safety plan discussed in their own words.  Patient given the number to the office. Number also available to the 24- hour suicide hotline.          MEDS ORDERED DURING VISIT:  New Medications Ordered This Visit   Medications   • hydrOXYzine (ATARAX) 25 MG tablet     Sig: Take 1 tablet by mouth 3 (Three) Times a Day As Needed (anxiety and/or sleep).     Dispense:  30 tablet     Refill:  1   • traZODone (DESYREL) 100 MG tablet     Sig: Take 1 tablet by mouth Every Night.     Dispense:  30 tablet     Refill:  1   • busPIRone (BUSPAR) 30 MG tablet     Sig: Take 1 tablet by mouth 2 (Two) Times a Day.     Dispense:  60 tablet     Refill:  1   • escitalopram (LEXAPRO) 20 MG tablet     Sig: Take 1 tablet by mouth Daily.     Dispense:  30 tablet     Refill:  1   • OXcarbazepine (Trileptal) 150 MG tablet     Sig: Take 0.5 tablets by mouth every night at bedtime.     Dispense:  15 tablet     Refill:  1       Return in about 8 weeks (around 12/2/2021), or if symptoms worsen or fail to improve, for Next scheduled follow up and Recheck.        Progress Towards Goal: Not at goal    Functional Status: Moderate impairment     Prognosis: Guarded with Ongoing Treatment     Treatment plan completed: 4/15/21.            This document has been electronically  signed by AVERY Nettles  October 7, 2021 12:12 EDT    Some of the data in this electronic note has been brought forward from a previous encounter, any necessary changes have been made, it has been reviewed by this APRN, and it is accurate.    Please note that portions of this note were completed with a voice recognition program. Efforts were made to edit dictation, but occasionally words are mistranscribed.

## 2021-11-16 DIAGNOSIS — F41.9 ANXIETY DISORDER, UNSPECIFIED TYPE: Chronic | ICD-10-CM

## 2021-11-16 DIAGNOSIS — G47.9 SLEEPING DIFFICULTIES: ICD-10-CM

## 2021-11-16 RX ORDER — HYDROXYZINE HYDROCHLORIDE 25 MG/1
25 TABLET, FILM COATED ORAL 3 TIMES DAILY PRN
Qty: 30 TABLET | Refills: 0 | Status: SHIPPED | OUTPATIENT
Start: 2021-11-16 | End: 2022-03-01 | Stop reason: SDUPTHER

## 2021-12-10 DIAGNOSIS — F39 MOOD DISORDER (HCC): Chronic | ICD-10-CM

## 2021-12-13 RX ORDER — OXCARBAZEPINE 150 MG/1
75 TABLET, FILM COATED ORAL
Qty: 15 TABLET | Refills: 1 | Status: SHIPPED | OUTPATIENT
Start: 2021-12-13 | End: 2022-01-24 | Stop reason: SDUPTHER

## 2021-12-21 DIAGNOSIS — F39 MOOD DISORDER (HCC): Chronic | ICD-10-CM

## 2021-12-21 DIAGNOSIS — F41.9 ANXIETY DISORDER, UNSPECIFIED TYPE: Chronic | ICD-10-CM

## 2021-12-21 RX ORDER — ESCITALOPRAM OXALATE 20 MG/1
20 TABLET ORAL DAILY
Qty: 30 TABLET | Refills: 0 | Status: SHIPPED | OUTPATIENT
Start: 2021-12-21 | End: 2022-01-24 | Stop reason: SDUPTHER

## 2022-01-24 DIAGNOSIS — F41.9 ANXIETY DISORDER, UNSPECIFIED TYPE: Chronic | ICD-10-CM

## 2022-01-24 DIAGNOSIS — F39 MOOD DISORDER: Chronic | ICD-10-CM

## 2022-01-24 RX ORDER — OXCARBAZEPINE 150 MG/1
75 TABLET, FILM COATED ORAL
Qty: 15 TABLET | Refills: 0 | Status: SHIPPED | OUTPATIENT
Start: 2022-01-24 | End: 2022-03-21

## 2022-01-24 RX ORDER — ESCITALOPRAM OXALATE 20 MG/1
20 TABLET ORAL DAILY
Qty: 30 TABLET | Refills: 0 | Status: SHIPPED | OUTPATIENT
Start: 2022-01-24 | End: 2022-03-24 | Stop reason: SDUPTHER

## 2022-02-23 DIAGNOSIS — G47.9 SLEEPING DIFFICULTIES: ICD-10-CM

## 2022-02-23 DIAGNOSIS — F39 MOOD DISORDER: Chronic | ICD-10-CM

## 2022-02-23 DIAGNOSIS — F41.9 ANXIETY DISORDER, UNSPECIFIED TYPE: Chronic | ICD-10-CM

## 2022-02-23 RX ORDER — ESCITALOPRAM OXALATE 20 MG/1
20 TABLET ORAL DAILY
Qty: 30 TABLET | Refills: 0 | OUTPATIENT
Start: 2022-02-23

## 2022-02-23 RX ORDER — BUSPIRONE HYDROCHLORIDE 30 MG/1
30 TABLET ORAL 2 TIMES DAILY
Qty: 60 TABLET | Refills: 1 | OUTPATIENT
Start: 2022-02-23

## 2022-02-23 RX ORDER — OXCARBAZEPINE 150 MG/1
75 TABLET, FILM COATED ORAL
Qty: 15 TABLET | Refills: 0 | OUTPATIENT
Start: 2022-02-23

## 2022-02-23 RX ORDER — HYDROXYZINE HYDROCHLORIDE 25 MG/1
25 TABLET, FILM COATED ORAL 3 TIMES DAILY PRN
Qty: 30 TABLET | Refills: 0 | OUTPATIENT
Start: 2022-02-23

## 2022-03-01 DIAGNOSIS — F39 MOOD DISORDER: Chronic | ICD-10-CM

## 2022-03-01 DIAGNOSIS — G47.9 SLEEPING DIFFICULTIES: ICD-10-CM

## 2022-03-01 DIAGNOSIS — F41.9 ANXIETY DISORDER, UNSPECIFIED TYPE: Chronic | ICD-10-CM

## 2022-03-01 RX ORDER — ESCITALOPRAM OXALATE 20 MG/1
20 TABLET ORAL DAILY
Qty: 30 TABLET | Refills: 0 | Status: CANCELLED | OUTPATIENT
Start: 2022-03-01

## 2022-03-01 RX ORDER — BUSPIRONE HYDROCHLORIDE 30 MG/1
30 TABLET ORAL 2 TIMES DAILY
Qty: 60 TABLET | Refills: 1 | OUTPATIENT
Start: 2022-03-01

## 2022-03-01 RX ORDER — OXCARBAZEPINE 150 MG/1
75 TABLET, FILM COATED ORAL
Qty: 15 TABLET | Refills: 0 | Status: CANCELLED | OUTPATIENT
Start: 2022-03-01

## 2022-03-01 RX ORDER — HYDROXYZINE HYDROCHLORIDE 25 MG/1
25 TABLET, FILM COATED ORAL 3 TIMES DAILY PRN
Qty: 30 TABLET | Refills: 0 | OUTPATIENT
Start: 2022-03-01

## 2022-03-01 NOTE — TELEPHONE ENCOUNTER
PATIENT SCHEDULED AN APPOINTMENT FOR 3/21/2022.  PATIENT HAS BEEN WITHOUT INSURANCE AND COULD NOT AFFORD TO PAY AN OFFICE VISIT UNTIL NOW.  PATIENT REQUESTED A REFILL.

## 2022-03-01 NOTE — TELEPHONE ENCOUNTER
How long has the patient been off of her medicine, because if she has been off of her medications for a while we will need to adjust her doses, if you could call and find out.  Thanks!

## 2022-03-01 NOTE — TELEPHONE ENCOUNTER
Patient needs to call and talk with staff regarding what medicines she is currently taking and at what dosage, and/or how long she has been out of all medicines.

## 2022-03-02 RX ORDER — HYDROXYZINE HYDROCHLORIDE 25 MG/1
25 TABLET, FILM COATED ORAL 3 TIMES DAILY PRN
Qty: 30 TABLET | Refills: 0 | Status: SHIPPED | OUTPATIENT
Start: 2022-03-02 | End: 2022-03-24 | Stop reason: SDUPTHER

## 2022-03-02 RX ORDER — BUSPIRONE HYDROCHLORIDE 30 MG/1
30 TABLET ORAL 2 TIMES DAILY
Qty: 60 TABLET | Refills: 0 | Status: SHIPPED | OUTPATIENT
Start: 2022-03-02 | End: 2022-03-24 | Stop reason: SDUPTHER

## 2022-03-02 NOTE — TELEPHONE ENCOUNTER
Patient has been off of her medications for about a week.  The only thing she needs is hydroxizine and buspar.

## 2022-03-21 DIAGNOSIS — F39 MOOD DISORDER: Chronic | ICD-10-CM

## 2022-03-21 RX ORDER — OXCARBAZEPINE 150 MG/1
TABLET, FILM COATED ORAL
Qty: 15 TABLET | Refills: 1 | Status: SHIPPED | OUTPATIENT
Start: 2022-03-21 | End: 2022-03-24 | Stop reason: SDUPTHER

## 2022-03-24 ENCOUNTER — TELEMEDICINE (OUTPATIENT)
Dept: PSYCHIATRY | Facility: CLINIC | Age: 27
End: 2022-03-24

## 2022-03-24 DIAGNOSIS — F39 MOOD DISORDER: Primary | Chronic | ICD-10-CM

## 2022-03-24 DIAGNOSIS — G47.9 SLEEPING DIFFICULTIES: ICD-10-CM

## 2022-03-24 DIAGNOSIS — F41.9 ANXIETY DISORDER, UNSPECIFIED TYPE: Chronic | ICD-10-CM

## 2022-03-24 PROCEDURE — 99214 OFFICE O/P EST MOD 30 MIN: CPT | Performed by: NURSE PRACTITIONER

## 2022-03-24 RX ORDER — HYDROXYZINE HYDROCHLORIDE 25 MG/1
25 TABLET, FILM COATED ORAL 3 TIMES DAILY PRN
Qty: 30 TABLET | Refills: 2 | Status: SHIPPED | OUTPATIENT
Start: 2022-03-24 | End: 2022-07-13 | Stop reason: SDUPTHER

## 2022-03-24 RX ORDER — OXCARBAZEPINE 150 MG/1
150 TABLET, FILM COATED ORAL DAILY
Qty: 30 TABLET | Refills: 2 | Status: SHIPPED | OUTPATIENT
Start: 2022-03-24 | End: 2022-07-13 | Stop reason: SDUPTHER

## 2022-03-24 RX ORDER — BUSPIRONE HYDROCHLORIDE 30 MG/1
30 TABLET ORAL 2 TIMES DAILY
Qty: 60 TABLET | Refills: 2 | Status: SHIPPED | OUTPATIENT
Start: 2022-03-24 | End: 2022-07-13 | Stop reason: SDUPTHER

## 2022-03-24 RX ORDER — ESCITALOPRAM OXALATE 20 MG/1
20 TABLET ORAL DAILY
Qty: 30 TABLET | Refills: 2 | Status: SHIPPED | OUTPATIENT
Start: 2022-03-24 | End: 2022-06-29

## 2022-03-24 NOTE — PROGRESS NOTES
"This provider is located at the [facility] (through [facility] [place] [facility]), [address], [place], [address] using a secure [email or web address] Video Visit through [employer]. Patient is being seen remotely via telehealth at their home address in [place], and stated they are in a secure environment for this session. The patient's condition being diagnosed/treated is appropriate for telemedicine. The provider identified herself as well as her credentials.   The patient, and/or patients guardian, consent to be seen remotely, and when consent is given they understand that the consent allows for patient identifiable information to be sent to a third party as needed.   They may refuse to be seen remotely at any time. The electronic data is encrypted and password protected, and the patient and/or guardian has been advised of the potential risks to privacy not withstanding such measures.    You have chosen to receive care through a telehealth visit.  Do you consent to use a video/audio connection for your medical care today? Yes          Subjective   Yoli Quijano is a 26 y.o. female who presents today for follow up    Chief Complaint:  Depression, anxiety, and sleeping difficulties    Accompanied by: The patient is alone at today's encounter.    History of Present Illness:  The patient describes her mood as \"okay\" over the last few weeks.  The patient states she feels like her anxiety has gotten higher recently, and she sometimes is more irritable which she feels is related to her anxiety.  She states she feels her anxiety is higher because of the stress of all her college classes.  She states she is about to graduate with her Associates Degree, and then plans to transfer to [employer] for her BSN.  The patient rates her symptoms of anxiety at a 5-10/10 on a 0-10 scale, with 10 being the worst, depending on the situation.  The patient reports she does take hydroxyzine as needed, and usually only up to twice " per day when needed.  The patient rates her symptoms of depression at a 0/10 on a 0-10 scale, with 10 being the worst.  The patient reports she really does not feel sad, down, or depressed at all.  The patient reports her appetite as good.  The patient reports her sleep as fair to poor.  The patient states she is currently working third shift/night shift, and when she comes home around 8 AM her little girl is up and ready for the day to begin.  The patient reports she feels she could sleep much better if she were able to, but due to her work schedule she has not been able to get as much sleep as she would like.  The patient denies any new medical problems or changes in medications since last appointment with this facility.  The patient reports compliance with her current medication regimen, other than the trazodone, as the patient reports she has stopped taking trazodone due to her work schedule and not having much time in the day to sleep.  The patient denies any side effects or concerns from her current medication regimen.   The patient would like to adjust her medications at this visit to help with anxiety and irritability/mood swings.  The patient denies any suicidal or homicidal ideations, plans, or intent at today's encounter and is convincing.  The patient denies any auditory hallucinations or visual hallucinations.  The patient does not endorse any significant symptoms consistent with huy or psychosis at today's encounter.  The patient reports she currently does not have insurance, and does not qualify for Medicaid due to her current income.  The patient requests to schedule her appointments further out, such as 3-month follow-ups.        Prior Psychiatric Medications:  Abilify - caused a rash about a week into treatment.  Seroquel - states she didn't take it long enough to notice any effects.  Remeron - States she think it helped, but isn't sure.  Zoloft - helped a little bit, but not much.  Prozac -  ineffective.  Celexa - ineffective.  Lamictal - caused itching  Hydroxyzine  Trazodone  Buspar  Lexapro  Trileptal      Last Menstrual Period:  Currently Menstruating.  The patient was educated that her prescribed medications can have potential risk to a developing fetus. The patient is advised to contact this APRN/this office if she becomes pregnant or plans to become pregnant.  Pt verbalizes understanding and acknowledged agreement with this plan in her own words.  The patient states she has a history of 2 miscarriages, and after the birth of her daughter they told her she would never be able to have any more children.        The following portions of the patient's history were reviewed and updated as appropriate: allergies, current medications, past family history, past medical history, past social history, past surgical history and problem list.            Past Medical History:  Past Medical History:   Diagnosis Date   • Anxiety    • Depression    • Infectious viral hepatitis     Hep C from IV drug use       Social History:  Social History     Socioeconomic History   • Marital status:    Tobacco Use   • Smoking status: Former Smoker   • Smokeless tobacco: Never Used   • Tobacco comment: states quit cigarettes around 2018   Substance and Sexual Activity   • Alcohol use: Not Currently   • Drug use: Not Currently     Types: Methamphetamines, Marijuana     Comment: Stopped THC use in September 2020, stopped Meth. use in 2016   • Sexual activity: Yes     Partners: Male     Birth control/protection: None       Family History:  Family History   Problem Relation Age of Onset   • Bipolar disorder Mother    • Depression Mother    • No Known Problems Father    • Bipolar disorder Maternal Grandmother    • Dementia Paternal Grandmother        Past Surgical History:  Past Surgical History:   Procedure Laterality Date   • BREAST BIOPSY     • DILATATION AND CURETTAGE         Problem List:  There is no problem list on  file for this patient.      Allergy:   Allergies   Allergen Reactions   • Abilify [Aripiprazole] Rash        Current Medications:   Current Outpatient Medications   Medication Sig Dispense Refill   • busPIRone (BUSPAR) 30 MG tablet Take 1 tablet by mouth 2 (Two) Times a Day. 60 tablet 2   • escitalopram (LEXAPRO) 20 MG tablet Take 1 tablet by mouth Daily. 30 tablet 2   • hydrOXYzine (ATARAX) 25 MG tablet Take 1 tablet by mouth 3 (Three) Times a Day As Needed (anxiety and/or sleep). 30 tablet 2   • OXcarbazepine (TRILEPTAL) 150 MG tablet Take 1 tablet by mouth Daily. 30 tablet 2     No current facility-administered medications for this visit.         Review of Symptoms:    Review of Systems   Constitutional: Positive for fatigue.   HENT: Negative.    Eyes: Negative.    Respiratory: Negative.    Cardiovascular: Negative.    Gastrointestinal: Negative.    Endocrine: Negative.    Genitourinary: Negative.    Musculoskeletal: Negative.    Skin: Negative.    Neurological: Negative.    Psychiatric/Behavioral: Positive for agitation, decreased concentration, sleep disturbance, depressed mood and stress. Negative for behavioral problems, dysphoric mood, hallucinations, self-injury, suicidal ideas and negative for hyperactivity. The patient is nervous/anxious.          Physical Exam:   There were no vitals taken for this visit. There is no height or weight on file to calculate BMI.   Due to the remote nature of this encounter (virtual encounter), vitals were unable to be obtained.  Height stated at 69.5 inches.  Weight stated at around 187 pounds.        Physical Exam  Constitutional:       Appearance: She is well-developed.   Neurological:      Mental Status: She is alert and oriented to person, place, and time.   Psychiatric:         Attention and Perception: Attention normal. She is attentive.         Mood and Affect: Mood and affect normal.         Speech: Speech normal.         Behavior: Behavior normal. Behavior is  cooperative.         Thought Content: Thought content normal. Thought content is not paranoid or delusional. Thought content does not include homicidal or suicidal ideation. Thought content does not include homicidal or suicidal plan.         Cognition and Memory: Cognition and memory normal.         Judgment: Judgment normal.         Mental Status Exam:   Hygiene:   good  Cooperation:  Cooperative  Eye Contact:  Good  Psychomotor Behavior:  Appropriate  Affect:  Appropriate  Mood: normal  Hopelessness: Denies  Speech:  Normal  Thought Process:  Linear  Thought Content:  Mood congruent  Suicidal:  None  Homicidal:  None  Hallucinations:  None  Delusion:  None  Memory:  Intact  Orientation:  Person, Place, Time and Situation  Reliability:  good  Insight:  Good  Judgement:  Good  Impulse Control:  Good  Physical/Medical Issues:  No            Lab Results:   No recent labs for review.            Assessment/Plan   Problems Addressed this Visit    None     Visit Diagnoses     Mood disorder (HCC)  (Chronic)   -  Primary    Relevant Medications    busPIRone (BUSPAR) 30 MG tablet    escitalopram (LEXAPRO) 20 MG tablet    hydrOXYzine (ATARAX) 25 MG tablet    OXcarbazepine (TRILEPTAL) 150 MG tablet    Anxiety disorder, unspecified type  (Chronic)       Relevant Medications    busPIRone (BUSPAR) 30 MG tablet    escitalopram (LEXAPRO) 20 MG tablet    hydrOXYzine (ATARAX) 25 MG tablet    Sleeping difficulties        Relevant Medications    hydrOXYzine (ATARAX) 25 MG tablet      Diagnoses       Codes Comments    Mood disorder (HCC)    -  Primary ICD-10-CM: F39  ICD-9-CM: 296.90     Anxiety disorder, unspecified type     ICD-10-CM: F41.9  ICD-9-CM: 300.00     Sleeping difficulties     ICD-10-CM: G47.9  ICD-9-CM: 780.50           Visit Diagnoses:    ICD-10-CM ICD-9-CM   1. Mood disorder (HCC)  F39 296.90   2. Anxiety disorder, unspecified type  F41.9 300.00   3. Sleeping difficulties  G47.9 780.50          GOALS:  Short Term Goals:  Patient will be compliant with medication, and patient will have no significant medication related side effects.  Patient will be engaged in psychotherapy as indicated.  Patient will report subjective improvement of symptoms.  Long term goals: To stabilize mood and treat/improve subjective symptoms, the patient will stay out of the hospital, the patient will be at an optimal level of functioning, and the patient will take all medications as prescribed.  The patient verbalized understanding and agreement with goals that were mutually set.      TREATMENT PLAN: START supportive psychotherapy efforts and TAKE medications as indicated.  Medication and treatment options, both pharmacological and non-pharmacological treatment options, discussed during today's visit, including off label usage of medication. Patient acknowledged and verbally consented with current treatment plan and was educated on the importance of compliance with treatment and follow-up appointments.      -Continue hydroxyzine 25 mg by mouth to take up to 3 times daily as needed for anxiety and or sleep.  -Continue Lexapro 20 mg by mouth once daily for mood.  -Continue BuSpar 30 mg by mouth twice daily for mood.  -Increase Trileptal to 150 mg by mouth once daily at bedtime for mood.    -Discontinue trazodone as patient has not been taking it.      MEDICATION ISSUES:  Discussed medication options and treatment plan of prescribed medication, any off label use of medication, as well as the risks, benefits, any black box warnings including increased suicidality, and side effects including but not limited to potential falls, dizziness, possible impaired driving, GI side effects (change in appetite, abdominal discomfort, nausea, vomiting, diarrhea, and/or constipation), dry mouth, somnolence, sedation, insomnia, activation, agitation, irritation, tremors, abnormal muscle movements or disorders, headache, sweating, possible bruising or rare bleeding, electrolyte  and/or fluid abnormalities, change in blood pressure/heart rate/and or heart rhythm, sexual dysfunction, and metabolic adversities among others. Patient and/or guardian agreeable to call the office with any worsening of symptoms or onset of side effects, or if any concerns or questions arise.  The contact information for the office is made available to the patient and/or guardian.  Patient and/or guardian agreeable to call 911 or go to the nearest ER should they begin having any SI/HI, or if any urgent concerns arise. No medication side effects or related complaints today.      SUICIDE RISK ASSESSMENT AND SAFETY PLAN: Unalterable demographics and a history of mental health intervention indicate this patient is in a high risk category compared to the general population. At present, the patient denies active SI/HI, intentions, or plans at this time and agrees to seek immediate care should such thoughts develop. The patient verbalizes understanding of how to access emergency care if needed and agrees to do so. Consideration of suicide risk and protective factors such as history, current presentation, individual strengths and weaknesses, psychosocial and environmental stressors and variables, psychiatric illness and symptoms, medical conditions and pain, took place in this interview. Based on those considerations, the patient is determined: within individual baseline and presenting no imminent risk for suicide or homicide. Other recommendations: The patient does not meet the criteria for inpatient admission and is not a safety risk to self or others at today's visit. Inpatient treatment offers no significant advantages over outpatient treatment for this patient at today's visit.  The patient was given ample time for questions and fully participated in treatment planning.  The patient was encouraged to call the clinic with any questions or concerns.  The patient was informed of access to emergency care. If patient were  to develop any significant symptomatology, suicidal ideation, homicidal ideation, any concerns, or feel unsafe at any time they are to call the clinic and if unable to get immediate assistance should immediately call 911 or go to the nearest emergency room.  Patient contracted verbally for the following: If you are experiencing an emotional crisis or have thoughts of harming yourself or others, please go to your nearest local emergency room or call 911. Will continue to re-assess medication response and side effects frequently to establish efficacy and ensure safety. Risks, any black box warnings, side effects, off label usage, and benefits of medication and treatment discussed with patient, along with potential adverse side effects of current and/or newly prescribed medication, alternative treatment options, and OTC medications.  Patient verbalized understanding of potential risks, any off label use of medication, any black box warnings, and any side effects in their own words. The patient verbalized understanding and agreed to comply with the safety plan discussed in their own words.  Patient given the number to the office. Number also discussed of the 24- hour suicide hotline.           MEDS ORDERED DURING VISIT:  New Medications Ordered This Visit   Medications   • busPIRone (BUSPAR) 30 MG tablet     Sig: Take 1 tablet by mouth 2 (Two) Times a Day.     Dispense:  60 tablet     Refill:  2   • escitalopram (LEXAPRO) 20 MG tablet     Sig: Take 1 tablet by mouth Daily.     Dispense:  30 tablet     Refill:  2   • hydrOXYzine (ATARAX) 25 MG tablet     Sig: Take 1 tablet by mouth 3 (Three) Times a Day As Needed (anxiety and/or sleep).     Dispense:  30 tablet     Refill:  2   • OXcarbazepine (TRILEPTAL) 150 MG tablet     Sig: Take 1 tablet by mouth Daily.     Dispense:  30 tablet     Refill:  2     This prescription was filled on 3/1/2022. Any refills authorized will be placed on file.       Return in about 3 months  (around 6/24/2022), or if symptoms worsen or fail to improve, for Next scheduled follow up and Recheck.        Progress Towards Goal: Not at goal    Functional Status: Moderate impairment     Prognosis: Guarded with Ongoing Treatment     Treatment plan completed: 4/15/21.            This document has been electronically signed by AVERY Nettles  March 24, 2022 08:54 EDT    Some of the data in this electronic note has been brought forward from a previous encounter, any necessary changes have been made, it has been reviewed by this APRN, and it is accurate.    Please note that portions of this note were completed with a voice recognition program.

## 2022-06-29 DIAGNOSIS — F39 MOOD DISORDER: Chronic | ICD-10-CM

## 2022-06-29 DIAGNOSIS — F41.9 ANXIETY DISORDER, UNSPECIFIED TYPE: Chronic | ICD-10-CM

## 2022-06-29 RX ORDER — ESCITALOPRAM OXALATE 20 MG/1
TABLET ORAL
Qty: 30 TABLET | Refills: 0 | Status: SHIPPED | OUTPATIENT
Start: 2022-06-29 | End: 2022-07-13 | Stop reason: SDUPTHER

## 2022-07-13 DIAGNOSIS — G47.9 SLEEPING DIFFICULTIES: ICD-10-CM

## 2022-07-13 DIAGNOSIS — F39 MOOD DISORDER: Chronic | ICD-10-CM

## 2022-07-13 DIAGNOSIS — F41.9 ANXIETY DISORDER, UNSPECIFIED TYPE: Chronic | ICD-10-CM

## 2022-07-13 RX ORDER — HYDROXYZINE HYDROCHLORIDE 25 MG/1
25 TABLET, FILM COATED ORAL 3 TIMES DAILY PRN
Qty: 30 TABLET | Refills: 0 | Status: SHIPPED | OUTPATIENT
Start: 2022-07-13 | End: 2022-11-28 | Stop reason: SDUPTHER

## 2022-07-13 RX ORDER — ESCITALOPRAM OXALATE 20 MG/1
20 TABLET ORAL DAILY
Qty: 30 TABLET | Refills: 0 | Status: SHIPPED | OUTPATIENT
Start: 2022-07-13 | End: 2022-10-22 | Stop reason: SDUPTHER

## 2022-07-13 RX ORDER — OXCARBAZEPINE 150 MG/1
150 TABLET, FILM COATED ORAL DAILY
Qty: 30 TABLET | Refills: 0 | Status: SHIPPED | OUTPATIENT
Start: 2022-07-13 | End: 2022-10-22 | Stop reason: SDUPTHER

## 2022-07-13 RX ORDER — BUSPIRONE HYDROCHLORIDE 30 MG/1
30 TABLET ORAL 2 TIMES DAILY
Qty: 60 TABLET | Refills: 0 | Status: SHIPPED | OUTPATIENT
Start: 2022-07-13 | End: 2022-11-28 | Stop reason: SDUPTHER

## 2022-10-19 DIAGNOSIS — F39 MOOD DISORDER: Chronic | ICD-10-CM

## 2022-10-19 RX ORDER — OXCARBAZEPINE 150 MG/1
TABLET, FILM COATED ORAL
Qty: 15 TABLET | Refills: 1 | OUTPATIENT
Start: 2022-10-19

## 2022-10-22 DIAGNOSIS — F41.9 ANXIETY DISORDER, UNSPECIFIED TYPE: Chronic | ICD-10-CM

## 2022-10-22 DIAGNOSIS — F39 MOOD DISORDER: Chronic | ICD-10-CM

## 2022-10-25 RX ORDER — OXCARBAZEPINE 150 MG/1
150 TABLET, FILM COATED ORAL DAILY
Qty: 30 TABLET | Refills: 0 | Status: SHIPPED | OUTPATIENT
Start: 2022-10-25 | End: 2022-11-28 | Stop reason: SDUPTHER

## 2022-10-25 RX ORDER — ESCITALOPRAM OXALATE 20 MG/1
20 TABLET ORAL DAILY
Qty: 30 TABLET | Refills: 0 | Status: SHIPPED | OUTPATIENT
Start: 2022-10-25 | End: 2022-11-28 | Stop reason: SDUPTHER

## 2022-10-25 NOTE — TELEPHONE ENCOUNTER
Called pt Home number and it is no longer her phone number.    Called pt Mobile number and the vm was not set up.    Sent pt a [employer] message to call the office to schedule an appt with provider.  
Pt called and rescheduled for 11/17/2022  
The patient is requesting refills but has not been seen in 7 months, can somebody reach out to this patient to help them get scheduled for a follow-up appointment?  Thanks.
Chart(s)/Patient

## 2022-11-28 DIAGNOSIS — F39 MOOD DISORDER: Chronic | ICD-10-CM

## 2022-11-28 DIAGNOSIS — F41.9 ANXIETY DISORDER, UNSPECIFIED TYPE: Chronic | ICD-10-CM

## 2022-11-28 DIAGNOSIS — G47.9 SLEEPING DIFFICULTIES: ICD-10-CM

## 2022-11-28 RX ORDER — OXCARBAZEPINE 150 MG/1
150 TABLET, FILM COATED ORAL DAILY
Qty: 30 TABLET | Refills: 0 | Status: SHIPPED | OUTPATIENT
Start: 2022-11-28 | End: 2022-12-08 | Stop reason: SDUPTHER

## 2022-11-28 RX ORDER — ESCITALOPRAM OXALATE 20 MG/1
20 TABLET ORAL DAILY
Qty: 30 TABLET | Refills: 0 | Status: SHIPPED | OUTPATIENT
Start: 2022-11-28 | End: 2022-12-08 | Stop reason: SDUPTHER

## 2022-11-28 RX ORDER — HYDROXYZINE HYDROCHLORIDE 25 MG/1
25 TABLET, FILM COATED ORAL 3 TIMES DAILY PRN
Qty: 30 TABLET | Refills: 0 | Status: SHIPPED | OUTPATIENT
Start: 2022-11-28 | End: 2022-12-08 | Stop reason: SDUPTHER

## 2022-11-28 RX ORDER — BUSPIRONE HYDROCHLORIDE 30 MG/1
30 TABLET ORAL 2 TIMES DAILY
Qty: 60 TABLET | Refills: 0 | Status: SHIPPED | OUTPATIENT
Start: 2022-11-28 | End: 2022-12-08 | Stop reason: SDUPTHER

## 2022-11-28 NOTE — TELEPHONE ENCOUNTER
Regular provider is out of the office. Will send in a 30 day supply to get her to her scheduled appointment

## 2022-12-08 ENCOUNTER — TELEPHONE (OUTPATIENT)
Dept: PSYCHIATRY | Facility: CLINIC | Age: 27
End: 2022-12-08

## 2022-12-08 ENCOUNTER — TELEMEDICINE (OUTPATIENT)
Dept: PSYCHIATRY | Facility: CLINIC | Age: 27
End: 2022-12-08

## 2022-12-08 DIAGNOSIS — F39 MOOD DISORDER: Primary | Chronic | ICD-10-CM

## 2022-12-08 DIAGNOSIS — G47.9 SLEEPING DIFFICULTIES: ICD-10-CM

## 2022-12-08 DIAGNOSIS — F41.9 ANXIETY DISORDER, UNSPECIFIED TYPE: Chronic | ICD-10-CM

## 2022-12-08 PROCEDURE — 99214 OFFICE O/P EST MOD 30 MIN: CPT | Performed by: NURSE PRACTITIONER

## 2022-12-08 RX ORDER — OXCARBAZEPINE 150 MG/1
150 TABLET, FILM COATED ORAL 2 TIMES DAILY
Qty: 60 TABLET | Refills: 0 | Status: SHIPPED | OUTPATIENT
Start: 2022-12-08 | End: 2022-12-13 | Stop reason: SDUPTHER

## 2022-12-08 RX ORDER — BUSPIRONE HYDROCHLORIDE 30 MG/1
30 TABLET ORAL 2 TIMES DAILY
Qty: 60 TABLET | Refills: 0 | Status: SHIPPED | OUTPATIENT
Start: 2022-12-08 | End: 2022-12-13 | Stop reason: SDUPTHER

## 2022-12-08 RX ORDER — HYDROXYZINE HYDROCHLORIDE 25 MG/1
25 TABLET, FILM COATED ORAL 3 TIMES DAILY PRN
Qty: 90 TABLET | Refills: 0 | Status: SHIPPED | OUTPATIENT
Start: 2022-12-08 | End: 2022-12-13

## 2022-12-08 RX ORDER — ESCITALOPRAM OXALATE 20 MG/1
20 TABLET ORAL DAILY
Qty: 30 TABLET | Refills: 0 | Status: SHIPPED | OUTPATIENT
Start: 2022-12-08 | End: 2022-12-13

## 2022-12-08 NOTE — PROGRESS NOTES
"This provider is located at the [facility] (through [facility]), [address], [facility], [address] using a secure [employer] Video Visit through [employer]. Patient is being seen remotely via telehealth at their home address in [place], and stated they are in a secure environment for this session. The patient's condition being diagnosed/treated is appropriate for telemedicine. The provider identified herself as well as her credentials.   The patient, and/or patients guardian, consent to be seen remotely, and when consent is given they understand that the consent allows for patient identifiable information to be sent to a third party as needed.   They may refuse to be seen remotely at any time. The electronic data is encrypted and password protected, and the patient and/or guardian has been advised of the potential risks to privacy not withstanding such measures.  Patient identifiers utilized: Name and date of birth.    You have chosen to receive care through a telehealth visit.  Do you consent to use a video/audio connection for your medical care today? Yes    Subjective   Yoli Quijano is a 27 y.o. female who presents today for follow up    Chief Complaint:  Depression, anxiety, and sleeping difficulties follow-up    Accompanied by: The patient is alone at today's encounter.    History of Present Illness:  The patient has not been seen March 2022.  The patient reports she still continues to take her medication daily as prescribed, and has not been without medicine since March of this year. The patient describes her mood as \"not good\" recently.  The patient states she does not feel her medicine works anymore to help with her moods, and she has been having more irritability, anger, and impulsivity.  She reports her impulsivity includes things such as throwing an miriam tray at her spouse, and taking the BB gun and shattering the screen door in the home when she was mad.  The " patient reports she used to enjoy her job, but does not enjoy her job anymore due to her moods and ended up quitting her job.  The patient reports it seems the smallest things trigger her anger and irritability.  The patient rates her symptoms of depression at a 0/10 on a 0-10 scale, with 10 being the worst.  The patient reports she does not feel depressed any at all, and anxiety and irritability are her biggest complaints.  The patient rates her symptoms of anxiety at a 10/10 on a 0-10 scale, with 10 being the worst.  The patient states her anxiety has been so bad she has started picking at her skin again.  The patient reports her appetite as good.  The patient reports her sleep as poor.  The patient reports averaging only a couple to few hours sleep each night, and then will sleep all day until her spouse wakes her up in the evenings.  The patient reports typically she has trouble falling asleep, and once she falls asleep she is able to stay asleep.  The patient denies any new medical problems or changes in medications since last appointment with this facility.  The patient reports compliance with her current medication regimen.  The patient denies any side effects or concerns from her current medication regimen.   The patient would like to adjust her medications at this visit to help with her anxiety, anger, and irritability if possible.  This APRN and the patient have discussed taking a stepwise approach to medication adjustments.  It has been discussed if we make several medication adjustments at one time and the patient has a negative response it would be more difficult to distinguish what changes caused what responses.  The patient verbalizes her understanding and agreement with this in her own words, and this APRN and the patient have discussed the patient following up in approximately 1 week for further monitoring of her moods, her responses to medication changes today, and for further psychotropic  medication changes as/if needed.  The patient denies any suicidal or homicidal ideations, plans, or intent at today's encounter and is convincing.  The patient denies any auditory hallucinations or visual hallucinations.  The patient does not endorse any significant symptoms consistent with huy or psychosis at today's encounter.  The patient is able to verbally contract for safety at today's encounter.      Prior Psychiatric Medications:  -Abilify - caused a rash about a week into treatment.  -Seroquel - states she didn't take it long enough to notice any effects.  -Remeron - States she think it helped, but isn't sure.  -Zoloft - helped a little bit, but not much.  -Prozac - ineffective.  -Celexa - ineffective.  -Lamictal - caused itching  -Trazodone  -Hydroxyzine  -Buspar  -Lexapro  -Trileptal      Last Menstrual Period:  2 weeks ago.  The patient was educated that her prescribed medications can have potential risk to a developing fetus. The patient is advised to contact this APRN/this office if she becomes pregnant or plans to become pregnant.  Pt verbalizes understanding and acknowledged agreement with this plan in her own words.  The patient states she has a history of 2 miscarriages, and after the birth of her daughter they told her she would never be able to have any more children.        The following portions of the patient's history were reviewed and updated as appropriate: allergies, current medications, past family history, past medical history, past social history, past surgical history and problem list.            Past Medical History:  Past Medical History:   Diagnosis Date   • Anxiety    • Depression    • Infectious viral hepatitis     Hep C from IV drug use       Social History:  Social History     Socioeconomic History   • Marital status:    Tobacco Use   • Smoking status: Former   • Smokeless tobacco: Never   • Tobacco comments:     states quit cigarettes around 2018   Substance and Sexual  Activity   • Alcohol use: Not Currently   • Drug use: Not Currently     Types: Methamphetamines, Marijuana     Comment: Stopped THC use in September 2020, stopped Meth. use in 2016   • Sexual activity: Yes     Partners: Male     Birth control/protection: None       Family History:  Family History   Problem Relation Age of Onset   • Bipolar disorder Mother    • Depression Mother    • No Known Problems Father    • Bipolar disorder Maternal Grandmother    • Dementia Paternal Grandmother        Past Surgical History:  Past Surgical History:   Procedure Laterality Date   • BREAST BIOPSY     • DILATATION AND CURETTAGE         Problem List:  There is no problem list on file for this patient.      Allergy:   Allergies   Allergen Reactions   • Abilify [Aripiprazole] Rash        Current Medications:   Current Outpatient Medications   Medication Sig Dispense Refill   • busPIRone (BUSPAR) 30 MG tablet Take 1 tablet by mouth 2 (Two) Times a Day. 60 tablet 0   • escitalopram (LEXAPRO) 20 MG tablet Take 1 tablet by mouth Daily. 30 tablet 0   • hydrOXYzine (ATARAX) 25 MG tablet Take 1 tablet by mouth 3 (Three) Times a Day As Needed (anxiety and/or sleep). 90 tablet 0   • OXcarbazepine (TRILEPTAL) 150 MG tablet Take 1 tablet by mouth 2 (Two) Times a Day. 60 tablet 0     No current facility-administered medications for this visit.         Review of Symptoms:    Review of Systems   Psychiatric/Behavioral: Positive for agitation, behavioral problems, decreased concentration, sleep disturbance, depressed mood and stress. Negative for dysphoric mood, hallucinations, self-injury, suicidal ideas and negative for hyperactivity. The patient is nervous/anxious.          Physical Exam:   There were no vitals taken for this visit. There is no height or weight on file to calculate BMI.   Due to the remote nature of this encounter (virtual encounter), vitals were unable to be obtained.  Height stated at 69.5 inches.  Weight stated at around 190  pounds.        Physical Exam  Constitutional:       Appearance: She is well-developed.   Neurological:      Mental Status: She is alert and oriented to person, place, and time.   Psychiatric:         Attention and Perception: Attention normal. She is attentive.         Mood and Affect: Mood is anxious and depressed. Affect is blunt.         Speech: Speech normal.         Behavior: Behavior normal. Behavior is cooperative.         Thought Content: Thought content normal. Thought content is not paranoid or delusional. Thought content does not include homicidal or suicidal ideation. Thought content does not include homicidal or suicidal plan.         Cognition and Memory: Cognition and memory normal.         Mental Status Exam:   Hygiene:   good  Cooperation:  Cooperative  Eye Contact:  Good  Psychomotor Behavior:  Appropriate  Affect:  Blunted  Mood: depressed and anxious  Hopelessness: Denies  Speech:  Normal  Thought Process:  Linear  Thought Content:  Mood congruent  Suicidal:  None  Homicidal:  None  Hallucinations:  None  Delusion:  None  Memory:  Intact  Orientation:  Person, Place, Time and Situation  Reliability:  good  Insight:  Good  Judgement:  Fair  Impulse Control:  Fair  Physical/Medical Issues:  No            Lab Results:   No recent labs for review.            Assessment & Plan   Problems Addressed this Visit    None  Visit Diagnoses     Mood disorder (HCC)  (Chronic)   -  Primary    R/O MDD, R/O Bipolar, R/O BPD    Relevant Medications    OXcarbazepine (TRILEPTAL) 150 MG tablet    hydrOXYzine (ATARAX) 25 MG tablet    escitalopram (LEXAPRO) 20 MG tablet    busPIRone (BUSPAR) 30 MG tablet    Anxiety disorder, unspecified type  (Chronic)       Relevant Medications    hydrOXYzine (ATARAX) 25 MG tablet    escitalopram (LEXAPRO) 20 MG tablet    busPIRone (BUSPAR) 30 MG tablet    Sleeping difficulties        Relevant Medications    hydrOXYzine (ATARAX) 25 MG tablet      Diagnoses       Codes Comments    Mood  disorder (HCC)    -  Primary ICD-10-CM: F39  ICD-9-CM: 296.90 R/O MDD, R/O Bipolar, R/O BPD    Anxiety disorder, unspecified type     ICD-10-CM: F41.9  ICD-9-CM: 300.00     Sleeping difficulties     ICD-10-CM: G47.9  ICD-9-CM: 780.50           Visit Diagnoses:    ICD-10-CM ICD-9-CM   1. Mood disorder (HCC)  F39 296.90   2. Anxiety disorder, unspecified type  F41.9 300.00   3. Sleeping difficulties  G47.9 780.50          GOALS:  Short Term Goals: Patient will be compliant with medication, and patient will have no significant medication related side effects.  Patient will be engaged in psychotherapy as indicated.  Patient will report subjective improvement of symptoms.  Long term goals: To stabilize mood and treat/improve subjective symptoms, the patient will stay out of the hospital, the patient will be at an optimal level of functioning, and the patient will take all medications as prescribed.  The patient verbalized understanding and agreement with goals that were mutually set.      TREATMENT PLAN: START supportive psychotherapy efforts and TAKE medications as indicated.  Medication and treatment options, both pharmacological and non-pharmacological treatment options, discussed during today's visit, including off label usage of medication. Patient acknowledged and verbally consented with current treatment plan and was educated on the importance of compliance with treatment and follow-up appointments.      -Continue hydroxyzine 25 mg by mouth to take up to 3 times daily as needed for anxiety and or sleep.  -Continue Lexapro 20 mg by mouth once daily for mood.  -Continue BuSpar 30 mg by mouth twice daily for mood.  -Increase Trileptal to 150 mg by mouth twice daily for mood.        MEDICATION ISSUES:  Discussed medication options and treatment plan of prescribed medication, any off label use of medication, as well as the risks, benefits, any black box warnings including increased suicidality, and side effects  including but not limited to potential falls, dizziness, possible impaired driving, GI side effects (change in appetite, abdominal discomfort, nausea, vomiting, diarrhea, and/or constipation), dry mouth, somnolence, sedation, insomnia, activation, agitation, irritation, tremors, abnormal muscle movements or disorders, headache, sweating, possible bruising or rare bleeding, electrolyte and/or fluid abnormalities, change in blood pressure/heart rate/and or heart rhythm, sexual dysfunction, and metabolic adversities among others. Patient and/or guardian agreeable to call the office with any worsening of symptoms or onset of side effects, or if any concerns or questions arise.  The contact information for the office is made available to the patient and/or guardian.  Patient and/or guardian agreeable to call 911 or go to the nearest ER should they begin having any SI/HI, or if any urgent concerns arise. No medication side effects or related complaints today.      SUICIDE RISK ASSESSMENT AND SAFETY PLAN: Unalterable demographics and a history of mental health intervention indicate this patient is in a high risk category compared to the general population. At present, the patient denies active SI/HI, intentions, or plans at this time and agrees to seek immediate care should such thoughts develop. The patient verbalizes understanding of how to access emergency care if needed and agrees to do so. Consideration of suicide risk and protective factors such as history, current presentation, individual strengths and weaknesses, psychosocial and environmental stressors and variables, psychiatric illness and symptoms, medical conditions and pain, took place in this interview. Based on those considerations, the patient is determined: within individual baseline and presenting no imminent risk for suicide or homicide. Other recommendations: The patient does not meet the criteria for inpatient admission and is not a safety risk to self  or others at today's visit. Inpatient treatment offers no significant advantages over outpatient treatment for this patient at today's visit.  The patient was given ample time for questions and fully participated in treatment planning.  The patient was encouraged to call the clinic with any questions or concerns.  The patient was informed of access to emergency care. If patient were to develop any significant symptomatology, suicidal ideation, homicidal ideation, any concerns, or feel unsafe at any time they are to call the clinic and if unable to get immediate assistance should immediately call 911 or go to the nearest emergency room.  Patient contracted verbally for the following: If you are experiencing an emotional crisis or have thoughts of harming yourself or others, please go to your nearest local emergency room or call 911. Will continue to re-assess medication response and side effects frequently to establish efficacy and ensure safety. Risks, any black box warnings, side effects, off label usage, and benefits of medication and treatment discussed with patient, along with potential adverse side effects of current and/or newly prescribed medication, alternative treatment options, and OTC medications.  Patient verbalized understanding of potential risks, any off label use of medication, any black box warnings, and any side effects in their own words. The patient verbalized understanding and agreed to comply with the safety plan discussed in their own words.  Patient given the number to the office. Number also discussed of the 24- hour suicide hotline.           MEDS ORDERED DURING VISIT:  New Medications Ordered This Visit   Medications   • OXcarbazepine (TRILEPTAL) 150 MG tablet     Sig: Take 1 tablet by mouth 2 (Two) Times a Day.     Dispense:  60 tablet     Refill:  0   • hydrOXYzine (ATARAX) 25 MG tablet     Sig: Take 1 tablet by mouth 3 (Three) Times a Day As Needed (anxiety and/or sleep).     Dispense:   90 tablet     Refill:  0   • escitalopram (LEXAPRO) 20 MG tablet     Sig: Take 1 tablet by mouth Daily.     Dispense:  30 tablet     Refill:  0   • busPIRone (BUSPAR) 30 MG tablet     Sig: Take 1 tablet by mouth 2 (Two) Times a Day.     Dispense:  60 tablet     Refill:  0       Return in about 1 week (around 12/15/2022), or if symptoms worsen or fail to improve, for Next scheduled follow up and Recheck.        Progress Towards Goal: Not at goal    Functional Status: Moderate impairment     Prognosis: Guarded with Ongoing Treatment             This document has been electronically signed by AVERY Nettles  December 8, 2022 17:47 EST    Some of the data in this electronic note has been brought forward from a previous encounter, any necessary changes have been made, it has been reviewed by this APRN, and it is accurate.    Please note that portions of this note were completed with a voice recognition program.

## 2022-12-13 ENCOUNTER — TELEMEDICINE (OUTPATIENT)
Dept: PSYCHIATRY | Facility: CLINIC | Age: 27
End: 2022-12-13

## 2022-12-13 DIAGNOSIS — F41.9 ANXIETY DISORDER, UNSPECIFIED TYPE: Chronic | ICD-10-CM

## 2022-12-13 DIAGNOSIS — F39 MOOD DISORDER: Primary | Chronic | ICD-10-CM

## 2022-12-13 DIAGNOSIS — G47.9 SLEEPING DIFFICULTIES: ICD-10-CM

## 2022-12-13 PROCEDURE — 99214 OFFICE O/P EST MOD 30 MIN: CPT | Performed by: NURSE PRACTITIONER

## 2022-12-13 RX ORDER — BUSPIRONE HYDROCHLORIDE 30 MG/1
30 TABLET ORAL 2 TIMES DAILY
Qty: 60 TABLET | Refills: 0 | Status: SHIPPED | OUTPATIENT
Start: 2022-12-13 | End: 2023-02-02 | Stop reason: SDUPTHER

## 2022-12-13 RX ORDER — DESVENLAFAXINE 25 MG/1
25 TABLET, EXTENDED RELEASE ORAL DAILY
Qty: 30 TABLET | Refills: 0 | Status: SHIPPED | OUTPATIENT
Start: 2022-12-13 | End: 2023-02-02 | Stop reason: SDUPTHER

## 2022-12-13 RX ORDER — OXCARBAZEPINE 150 MG/1
150 TABLET, FILM COATED ORAL 2 TIMES DAILY
Qty: 60 TABLET | Refills: 0 | Status: SHIPPED | OUTPATIENT
Start: 2022-12-13 | End: 2022-12-19 | Stop reason: SDUPTHER

## 2022-12-13 RX ORDER — PROPRANOLOL HYDROCHLORIDE 10 MG/1
10 TABLET ORAL 2 TIMES DAILY PRN
Qty: 60 TABLET | Refills: 0 | Status: SHIPPED | OUTPATIENT
Start: 2022-12-13 | End: 2023-02-02 | Stop reason: SDUPTHER

## 2022-12-13 NOTE — PROGRESS NOTES
This provider is located at the [facility] (through [facility] [place] [facility]), [address], [place], [address] using a secure [employer] Video Visit through [employer]. Patient is being seen remotely via telehealth at their home address in [place], and stated they are in a secure environment for this session. The patient's condition being diagnosed/treated is appropriate for telemedicine. The provider identified herself as well as her credentials.   The patient, and/or patients guardian, consent to be seen remotely, and when consent is given they understand that the consent allows for patient identifiable information to be sent to a third party as needed.   They may refuse to be seen remotely at any time. The electronic data is encrypted and password protected, and the patient and/or guardian has been advised of the potential risks to privacy not withstanding such measures.  Patient identifiers utilized: Name and date of birth.    You have chosen to receive care through a telehealth visit.  Do you consent to use a video/audio connection for your medical care today? Yes    Subjective   Yoli Quijano is a 27 y.o. female who presents today for follow up    Chief Complaint:  Depression, anxiety, and sleeping difficulties follow-up    Accompanied by: The patient is alone at today's encounter.    History of Present Illness:  The patient describes her mood as mostly anxious and irritable since her last encounter with this APRN.  The patient states she does not feel the recent increase in her Trileptal dosage and frequency has helped with her moods or her reported irritability symptoms.  The patient rates her symptoms of depression at a 2/10 on a 0-10 scale, with 10 being the worst.  The patient reports her symptoms of depression as feeling down at times.  The patient reports she does not feel her symptoms of depression are bad at this time.  The patient rates her symptoms of anxiety at a 10/10  "on a 0-10 scale, with 10 being the worst.  The patient reports her symptoms of anxiety as feeling nervous, feeling \"shaky\", and her chest will hurt like she is going to have a panic attack.  She reports if she is anxious she becomes irritable and \"hateful\".  The patient reports \"everything\" triggers her anxiety, including things such as the TV being too loud, her child doing things or being wild, and even going into stores.  The patient reports her appetite as decreased.  The patient reports her sleep as poor.  The patient states she averages 5-6 hours of sleep each night.  The patient reports occasional nightmares.  The patient reports she has difficulty both falling asleep and staying asleep.  The patient denies any new medical problems or changes in medications since last appointment with this facility.  The patient reports compliance with her current medication regimen.  The patient reports she has actually been taking hydroxyzine up to 4-5 times per day without any benefit, and reports she feels hydroxylate has lost efficacy.  The patient reports she also feels Lexapro has lost efficacy.  The patient denies any side effects from her current medication regimen.  The patient would like to adjust her medications at this visit to help with her continued worsened moods.  The patient denies any suicidal or homicidal ideations, plans, or intent at today's encounter and is convincing.  The patient denies any auditory hallucinations or visual hallucinations.  The patient does not endorse any significant symptoms consistent with huy or psychosis at today's encounter.      Prior Psychiatric Medications:  -Abilify - caused a rash about a week into treatment.  -Seroquel - states she didn't take it long enough to notice any effects.  -Remeron - States she think it helped, but isn't sure.  -Zoloft - helped a little bit, but not much.  -Prozac - ineffective.  -Celexa - ineffective.  -Lamictal - caused " itching  -Trazodone  -Hydroxyzine - lost efficacy  -Buspar  -Lexapro - lost efficacy  -Trileptal      Last Menstrual Period:  2-3 weeks ago.  The patient was educated that her prescribed medications can have potential risk to a developing fetus. The patient is advised to contact this APRN/this office if she becomes pregnant or plans to become pregnant.  Pt verbalizes understanding and acknowledged agreement with this plan in her own words.  The patient states she has a history of 2 miscarriages, and after the birth of her daughter they told her she would never be able to have any more children.        The following portions of the patient's history were reviewed and updated as appropriate: allergies, current medications, past family history, past medical history, past social history, past surgical history and problem list.            Past Medical History:  Past Medical History:   Diagnosis Date   • Anxiety    • Depression    • Infectious viral hepatitis     Hep C from IV drug use       Social History:  Social History     Socioeconomic History   • Marital status:    Tobacco Use   • Smoking status: Former   • Smokeless tobacco: Never   • Tobacco comments:     states quit cigarettes around 2018   Substance and Sexual Activity   • Alcohol use: Not Currently   • Drug use: Not Currently     Types: Methamphetamines, Marijuana     Comment: Stopped THC use in September 2020, stopped Meth. use in 2016   • Sexual activity: Yes     Partners: Male     Birth control/protection: None       Family History:  Family History   Problem Relation Age of Onset   • Bipolar disorder Mother    • Depression Mother    • No Known Problems Father    • Bipolar disorder Maternal Grandmother    • Dementia Paternal Grandmother        Past Surgical History:  Past Surgical History:   Procedure Laterality Date   • BREAST BIOPSY     • DILATATION AND CURETTAGE         Problem List:  There is no problem list on file for this patient.      Allergy:    Allergies   Allergen Reactions   • Abilify [Aripiprazole] Rash        Current Medications:   Current Outpatient Medications   Medication Sig Dispense Refill   • busPIRone (BUSPAR) 30 MG tablet Take 1 tablet by mouth 2 (Two) Times a Day. 60 tablet 0   • OXcarbazepine (TRILEPTAL) 150 MG tablet Take 1 tablet by mouth 2 (Two) Times a Day. 60 tablet 0   • Desvenlafaxine Succinate ER (Pristiq) 25 MG tablet sustained-release 24 hour Take 1 tablet by mouth Daily. 30 tablet 0   • propranolol (INDERAL) 10 MG tablet Take 1 tablet by mouth 2 (Two) Times a Day As Needed (anxiety). 60 tablet 0     No current facility-administered medications for this visit.         Review of Symptoms:    Review of Systems   Psychiatric/Behavioral: Positive for agitation, decreased concentration, sleep disturbance, depressed mood and stress. Negative for behavioral problems, dysphoric mood, hallucinations, self-injury, suicidal ideas and negative for hyperactivity. The patient is nervous/anxious.          Physical Exam:   There were no vitals taken for this visit. There is no height or weight on file to calculate BMI.   Due to the remote nature of this encounter (virtual encounter), vitals were unable to be obtained.  Height stated at 69.5 inches.  Weight stated at around 190 pounds.        Physical Exam  Constitutional:       Appearance: She is well-developed.   Neurological:      Mental Status: She is alert and oriented to person, place, and time.   Psychiatric:         Attention and Perception: Attention normal.         Mood and Affect: Mood is anxious and depressed. Affect is blunt.         Speech: Speech normal.         Behavior: Behavior normal. Behavior is cooperative.         Thought Content: Thought content normal. Thought content is not paranoid or delusional. Thought content does not include homicidal or suicidal ideation. Thought content does not include homicidal or suicidal plan.         Cognition and Memory: Cognition and memory  normal.         Judgment: Judgment is not impulsive or inappropriate.         Mental Status Exam:   Hygiene:   good  Cooperation:  Cooperative  Eye Contact:  Good  Psychomotor Behavior:  Appropriate  Affect:  Blunted  Mood: depressed and anxious  Hopelessness: Denies  Speech:  Normal  Thought Process:  Linear  Thought Content:  Mood congruent  Suicidal:  None  Homicidal:  None  Hallucinations:  None  Delusion:  None  Memory:  Intact   Orientation:  Person, Place, Time and Situation  Reliability:  good  Insight:  Good  Judgement:  Good  Impulse Control:  Good  Physical/Medical Issues:  No            Lab Results:   No recent labs for review.            Assessment & Plan   Problems Addressed this Visit    None  Visit Diagnoses     Mood disorder (HCC)  (Chronic)   -  Primary    R/O MDD, R/O Bipolar, R/O BPD    Relevant Medications    busPIRone (BUSPAR) 30 MG tablet    OXcarbazepine (TRILEPTAL) 150 MG tablet    Desvenlafaxine Succinate ER (Pristiq) 25 MG tablet sustained-release 24 hour    Anxiety disorder, unspecified type  (Chronic)       Relevant Medications    busPIRone (BUSPAR) 30 MG tablet    Desvenlafaxine Succinate ER (Pristiq) 25 MG tablet sustained-release 24 hour    propranolol (INDERAL) 10 MG tablet    Sleeping difficulties          Diagnoses       Codes Comments    Mood disorder (HCC)    -  Primary ICD-10-CM: F39  ICD-9-CM: 296.90 R/O MDD, R/O Bipolar, R/O BPD    Anxiety disorder, unspecified type     ICD-10-CM: F41.9  ICD-9-CM: 300.00     Sleeping difficulties     ICD-10-CM: G47.9  ICD-9-CM: 780.50           Visit Diagnoses:    ICD-10-CM ICD-9-CM   1. Mood disorder (HCC)  F39 296.90   2. Anxiety disorder, unspecified type  F41.9 300.00   3. Sleeping difficulties  G47.9 780.50          GOALS:  Short Term Goals: Patient will be compliant with medication, and patient will have no significant medication related side effects.  Patient will be engaged in psychotherapy as indicated.  Patient will report subjective  improvement of symptoms.  Long term goals: To stabilize mood and treat/improve subjective symptoms, the patient will stay out of the hospital, the patient will be at an optimal level of functioning, and the patient will take all medications as prescribed.  The patient verbalized understanding and agreement with goals that were mutually set.      TREATMENT PLAN: START supportive psychotherapy efforts and TAKE medications as indicated.  Medication and treatment options, both pharmacological and non-pharmacological treatment options, discussed during today's visit, including off label usage of medication. Patient acknowledged and verbally consented with current treatment plan and was educated on the importance of compliance with treatment and follow-up appointments.      -Discontinue hydroxyzine at today's encounter due to reported lack of efficacy.  -Discontinue Lexapro by taking half of a 20 mg Lexapro tablet for a total dosage of 10 mg by mouth once daily for 5 days, then take a fourth of the Lexapro 20 mg tablet for a total dosage of 5 mg by mouth once daily for 5 days, then completely stop taking Lexapro.  This APRN did offer to send in a prescription to taper this medication, but the patient reports she is comfortable splitting/cutting the pills for the taper process.  The patient is able to repeat back in her own words the directions for tapering Lexapro.  -Start Pristiq 25 mg by mouth once daily for mood.  -Start propranolol 10 mg by mouth up to twice daily as needed for anxiety.  -Continue BuSpar 30 mg by mouth twice daily for mood.  -Continue Trileptal 150 mg by mouth twice daily for mood.        MEDICATION ISSUES:  Discussed medication options and treatment plan of prescribed medication, any off label use of medication, as well as the risks, benefits, any black box warnings including increased suicidality, and side effects including but not limited to potential falls, dizziness, possible impaired driving, GI  side effects (change in appetite, abdominal discomfort, nausea, vomiting, diarrhea, and/or constipation), dry mouth, somnolence, sedation, insomnia, activation, agitation, irritation, tremors, abnormal muscle movements or disorders, headache, sweating, possible bruising or rare bleeding, electrolyte and/or fluid abnormalities, change in blood pressure/heart rate/and or heart rhythm, sexual dysfunction, and metabolic adversities among others. Patient and/or guardian agreeable to call the office with any worsening of symptoms or onset of side effects, or if any concerns or questions arise.  The contact information for the office is made available to the patient and/or guardian.  Patient and/or guardian agreeable to call 911 or go to the nearest ER should they begin having any SI/HI, or if any urgent concerns arise. No medication side effects or related complaints today.      SUICIDE RISK ASSESSMENT AND SAFETY PLAN: Unalterable demographics and a history of mental health intervention indicate this patient is in a high risk category compared to the general population. At present, the patient denies active SI/HI, intentions, or plans at this time and agrees to seek immediate care should such thoughts develop. The patient verbalizes understanding of how to access emergency care if needed and agrees to do so. Consideration of suicide risk and protective factors such as history, current presentation, individual strengths and weaknesses, psychosocial and environmental stressors and variables, psychiatric illness and symptoms, medical conditions and pain, took place in this interview. Based on those considerations, the patient is determined: within individual baseline and presenting no imminent risk for suicide or homicide. Other recommendations: The patient does not meet the criteria for inpatient admission and is not a safety risk to self or others at today's visit. Inpatient treatment offers no significant advantages over  outpatient treatment for this patient at today's visit.  The patient was given ample time for questions and fully participated in treatment planning.  The patient was encouraged to call the clinic with any questions or concerns.  The patient was informed of access to emergency care. If patient were to develop any significant symptomatology, suicidal ideation, homicidal ideation, any concerns, or feel unsafe at any time they are to call the clinic and if unable to get immediate assistance should immediately call 911 or go to the nearest emergency room.  Patient contracted verbally for the following: If you are experiencing an emotional crisis or have thoughts of harming yourself or others, please go to your nearest local emergency room or call 911. Will continue to re-assess medication response and side effects frequently to establish efficacy and ensure safety. Risks, any black box warnings, side effects, off label usage, and benefits of medication and treatment discussed with patient, along with potential adverse side effects of current and/or newly prescribed medication, alternative treatment options, and OTC medications.  Patient verbalized understanding of potential risks, any off label use of medication, any black box warnings, and any side effects in their own words. The patient verbalized understanding and agreed to comply with the safety plan discussed in their own words.  Patient given the number to the office. Number also discussed of the 24- hour suicide hotline.           MEDS ORDERED DURING VISIT:  New Medications Ordered This Visit   Medications   • busPIRone (BUSPAR) 30 MG tablet     Sig: Take 1 tablet by mouth 2 (Two) Times a Day.     Dispense:  60 tablet     Refill:  0   • OXcarbazepine (TRILEPTAL) 150 MG tablet     Sig: Take 1 tablet by mouth 2 (Two) Times a Day.     Dispense:  60 tablet     Refill:  0   • Desvenlafaxine Succinate ER (Pristiq) 25 MG tablet sustained-release 24 hour     Sig: Take 1  tablet by mouth Daily.     Dispense:  30 tablet     Refill:  0   • propranolol (INDERAL) 10 MG tablet     Sig: Take 1 tablet by mouth 2 (Two) Times a Day As Needed (anxiety).     Dispense:  60 tablet     Refill:  0       Return in about 4 weeks (around 1/10/2023), or if symptoms worsen or fail to improve, for Next scheduled follow up and Recheck.        Progress Towards Goal: Not at goal    Functional Status: Moderate impairment     Prognosis: Guarded with Ongoing Treatment             This document has been electronically signed by AVERY Nettles  December 13, 2022 09:42 EST    Some of the data in this electronic note has been brought forward from a previous encounter, any necessary changes have been made, it has been reviewed by this APRN, and it is accurate.    Please note that portions of this note were completed with a voice recognition program.

## 2022-12-19 DIAGNOSIS — F39 MOOD DISORDER: Chronic | ICD-10-CM

## 2022-12-20 RX ORDER — OXCARBAZEPINE 150 MG/1
150 TABLET, FILM COATED ORAL 2 TIMES DAILY
Qty: 60 TABLET | Refills: 0 | Status: SHIPPED | OUTPATIENT
Start: 2022-12-20 | End: 2023-01-22 | Stop reason: SDUPTHER

## 2023-01-10 ENCOUNTER — TELEPHONE (OUTPATIENT)
Dept: PSYCHIATRY | Facility: CLINIC | Age: 28
End: 2023-01-10

## 2023-01-10 NOTE — PROGRESS NOTES
Patient scheduled for appointment 1/10/2023 at 3:30 PM.  At time of scheduled appointment this APRN was unable to connect with patient.  This APRN, and staff, tried to reach out by telephone to the patient numerous attempts without success.  [facility]  This APRN instructed the staff to send patient a [employer] message to have her call the clinic back ASAP as she was not answering her documented telephone number, her video visit was not connecting, and this APRN was still wanting the patient to be seen due to recent medication changes and patient's recent moods and reported symptoms.  It is reported by staff that patient called the office back before a [employer] message could be sent.  It is documented patient became upset on the telephone and was yelling and cursing at staff, and reported to staff that she was not getting better with medications and was going to seek care somewhere else.  Staff reports patient would not allow staff to speak or try to resolve the situation, or assist the patient with continuing to be seen today.  The patient ended the telephone encounter and was not seen today per patient's choice.

## 2023-01-12 ENCOUNTER — TELEPHONE (OUTPATIENT)
Dept: PSYCHIATRY | Facility: CLINIC | Age: 28
End: 2023-01-12
Payer: COMMERCIAL

## 2023-01-12 NOTE — TELEPHONE ENCOUNTER
FYI - Patient called said she had 4 missed calls from this office and she thought she made herself clear, that she does not want to be seen by this office anymore and please do not ring/call her number ever again.         Thank You

## 2023-01-22 DIAGNOSIS — F39 MOOD DISORDER: Chronic | ICD-10-CM

## 2023-01-23 RX ORDER — OXCARBAZEPINE 150 MG/1
150 TABLET, FILM COATED ORAL 2 TIMES DAILY
Qty: 14 TABLET | Refills: 0 | Status: SHIPPED | OUTPATIENT
Start: 2023-01-23 | End: 2023-02-02 | Stop reason: SDUPTHER

## 2023-01-30 DIAGNOSIS — F39 MOOD DISORDER: Chronic | ICD-10-CM

## 2023-01-30 DIAGNOSIS — F41.9 ANXIETY DISORDER, UNSPECIFIED TYPE: Chronic | ICD-10-CM

## 2023-01-30 RX ORDER — OXCARBAZEPINE 150 MG/1
150 TABLET, FILM COATED ORAL 2 TIMES DAILY
Qty: 14 TABLET | Refills: 0 | OUTPATIENT
Start: 2023-01-30

## 2023-01-30 RX ORDER — BUSPIRONE HYDROCHLORIDE 30 MG/1
30 TABLET ORAL 2 TIMES DAILY
Qty: 60 TABLET | Refills: 0 | OUTPATIENT
Start: 2023-01-30

## 2023-01-30 RX ORDER — PROPRANOLOL HYDROCHLORIDE 10 MG/1
10 TABLET ORAL 2 TIMES DAILY PRN
Qty: 60 TABLET | Refills: 0 | OUTPATIENT
Start: 2023-01-30

## 2023-02-02 DIAGNOSIS — F39 MOOD DISORDER: Chronic | ICD-10-CM

## 2023-02-02 DIAGNOSIS — F41.9 ANXIETY DISORDER, UNSPECIFIED TYPE: Chronic | ICD-10-CM

## 2023-02-02 NOTE — TELEPHONE ENCOUNTER
Can you please reschedule the patient for an hour appointment?  The patient previously told staff to not call her number again, and she did not want to be treated by this provider or clinic anymore.  We can reschedule the patient, but I will need an hour appointment with her to discuss the patient's concerns.  Thanks.

## 2023-02-05 RX ORDER — DESVENLAFAXINE 25 MG/1
25 TABLET, EXTENDED RELEASE ORAL DAILY
Qty: 30 TABLET | Refills: 0 | Status: SHIPPED | OUTPATIENT
Start: 2023-02-05

## 2023-02-05 RX ORDER — BUSPIRONE HYDROCHLORIDE 30 MG/1
30 TABLET ORAL 2 TIMES DAILY
Qty: 60 TABLET | Refills: 0 | Status: SHIPPED | OUTPATIENT
Start: 2023-02-05

## 2023-02-05 RX ORDER — PROPRANOLOL HYDROCHLORIDE 10 MG/1
10 TABLET ORAL 2 TIMES DAILY PRN
Qty: 60 TABLET | Refills: 0 | Status: SHIPPED | OUTPATIENT
Start: 2023-02-05

## 2023-02-05 RX ORDER — OXCARBAZEPINE 150 MG/1
150 TABLET, FILM COATED ORAL 2 TIMES DAILY
Qty: 60 TABLET | Refills: 0 | Status: SHIPPED | OUTPATIENT
Start: 2023-02-05